# Patient Record
Sex: FEMALE | Race: WHITE | NOT HISPANIC OR LATINO
[De-identification: names, ages, dates, MRNs, and addresses within clinical notes are randomized per-mention and may not be internally consistent; named-entity substitution may affect disease eponyms.]

---

## 2017-11-09 ENCOUNTER — RECORD ABSTRACTING (OUTPATIENT)
Age: 67
End: 2017-11-09

## 2017-11-09 DIAGNOSIS — Z98.1 ARTHRODESIS STATUS: ICD-10-CM

## 2017-11-09 DIAGNOSIS — Z82.49 FAMILY HISTORY OF ISCHEMIC HEART DISEASE AND OTHER DISEASES OF THE CIRCULATORY SYSTEM: ICD-10-CM

## 2017-11-09 DIAGNOSIS — Z99.89 OBSTRUCTIVE SLEEP APNEA (ADULT) (PEDIATRIC): ICD-10-CM

## 2017-11-09 DIAGNOSIS — Q76.1 KLIPPEL-FEIL SYNDROME: ICD-10-CM

## 2017-11-09 DIAGNOSIS — K21.9 GASTRO-ESOPHAGEAL REFLUX DISEASE W/OUT ESOPHAGITIS: ICD-10-CM

## 2017-11-09 DIAGNOSIS — G47.33 OBSTRUCTIVE SLEEP APNEA (ADULT) (PEDIATRIC): ICD-10-CM

## 2017-11-09 DIAGNOSIS — E03.9 HYPOTHYROIDISM, UNSPECIFIED: ICD-10-CM

## 2017-11-09 DIAGNOSIS — Z78.9 OTHER SPECIFIED HEALTH STATUS: ICD-10-CM

## 2017-12-12 ENCOUNTER — APPOINTMENT (OUTPATIENT)
Dept: CARDIOLOGY | Facility: CLINIC | Age: 67
End: 2017-12-12
Payer: MEDICARE

## 2017-12-12 ENCOUNTER — NON-APPOINTMENT (OUTPATIENT)
Age: 67
End: 2017-12-12

## 2017-12-12 VITALS
WEIGHT: 166 LBS | DIASTOLIC BLOOD PRESSURE: 60 MMHG | HEIGHT: 64 IN | BODY MASS INDEX: 28.34 KG/M2 | SYSTOLIC BLOOD PRESSURE: 118 MMHG

## 2017-12-12 VITALS — HEART RATE: 60 BPM

## 2017-12-12 PROCEDURE — 93000 ELECTROCARDIOGRAM COMPLETE: CPT

## 2017-12-12 PROCEDURE — 99214 OFFICE O/P EST MOD 30 MIN: CPT

## 2018-11-02 ENCOUNTER — RX RENEWAL (OUTPATIENT)
Age: 68
End: 2018-11-02

## 2018-12-18 ENCOUNTER — NON-APPOINTMENT (OUTPATIENT)
Age: 68
End: 2018-12-18

## 2018-12-18 ENCOUNTER — APPOINTMENT (OUTPATIENT)
Dept: CARDIOLOGY | Facility: CLINIC | Age: 68
End: 2018-12-18
Payer: MEDICARE

## 2018-12-18 VITALS
SYSTOLIC BLOOD PRESSURE: 122 MMHG | HEART RATE: 71 BPM | DIASTOLIC BLOOD PRESSURE: 62 MMHG | WEIGHT: 170 LBS | BODY MASS INDEX: 29.18 KG/M2

## 2018-12-18 PROCEDURE — 93000 ELECTROCARDIOGRAM COMPLETE: CPT

## 2018-12-18 PROCEDURE — 99214 OFFICE O/P EST MOD 30 MIN: CPT

## 2018-12-18 NOTE — REASON FOR VISIT
[Follow-Up - Clinic] : a clinic follow-up of [Atrial Fibrillation] : atrial fibrillation [Hyperlipidemia] : hyperlipidemia [Medication Management] : Medication management

## 2018-12-18 NOTE — CARDIOLOGY SUMMARY
[___] : [unfilled] [No Ischemia] : no Ischemia [LVEF ___%] : LVEF [unfilled]% [None] : no pulmonary hypertension [Enlarged] : enlarged LA size

## 2018-12-18 NOTE — PHYSICAL EXAM
[General Appearance - Well Developed] : well developed [Normal Appearance] : normal appearance [Well Groomed] : well groomed [General Appearance - Well Nourished] : well nourished [No Deformities] : no deformities [General Appearance - In No Acute Distress] : no acute distress [Normal Conjunctiva] : the conjunctiva exhibited no abnormalities [Eyelids - No Xanthelasma] : the eyelids demonstrated no xanthelasmas [Normal Oral Mucosa] : normal oral mucosa [No Oral Pallor] : no oral pallor [No Oral Cyanosis] : no oral cyanosis [Normal Jugular Venous A Waves Present] : normal jugular venous A waves present [Normal Jugular Venous V Waves Present] : normal jugular venous V waves present [No Jugular Venous Alonzo A Waves] : no jugular venous alonzo A waves [Respiration, Rhythm And Depth] : normal respiratory rhythm and effort [Exaggerated Use Of Accessory Muscles For Inspiration] : no accessory muscle use [Auscultation Breath Sounds / Voice Sounds] : lungs were clear to auscultation bilaterally [Heart Rate And Rhythm] : heart rate and rhythm were normal [Heart Sounds] : normal S1 and S2 [Murmurs] : no murmurs present [Abdomen Soft] : soft [Abdomen Tenderness] : non-tender [Abdomen Mass (___ Cm)] : no abdominal mass palpated [Abnormal Walk] : normal gait [Gait - Sufficient For Exercise Testing] : the gait was sufficient for exercise testing [Nail Clubbing] : no clubbing of the fingernails [Cyanosis, Localized] : no localized cyanosis [Petechial Hemorrhages (___cm)] : no petechial hemorrhages [Skin Color & Pigmentation] : normal skin color and pigmentation [] : no rash [No Venous Stasis] : no venous stasis [Skin Lesions] : no skin lesions [No Skin Ulcers] : no skin ulcer [No Xanthoma] : no  xanthoma was observed [Oriented To Time, Place, And Person] : oriented to person, place, and time [Affect] : the affect was normal [Mood] : the mood was normal [No Anxiety] : not feeling anxious

## 2018-12-20 NOTE — REVIEW OF SYSTEMS
[see HPI] : see HPI [Palpitations] : palpitations [Recent Weight Gain (___ Lbs)] : recent [unfilled] ~Ulb weight gain [Negative] : Cardiovascular

## 2018-12-20 NOTE — HISTORY OF PRESENT ILLNESS
[FreeTextEntry1] : YUE GARCIA  is a 68 year old  F\par \par \par with a history of AF (see below), hypothyroidism, GERD, hyperlipidemia and cervical fusion.   \par There is possible von Willebrand disease. She tolerates the CPAP at night. \par Family history is notable for accelerated atherosclerosis.  It is notable that her family members had developed cardiovascular disease in the 60s.  Blood work did demonstrate a rise in her cholesterol.   She is taking her Lipitor therapy without any issues. No symptoms of myalgias or muscle aches. \par She has now retired. \par There are no palpitations.  There is no shortness of breath.   There has been no recurrence of atrial fibrillation. \par t baseline she is active.  She walks her dog, does yoga. \par She is compliant with her CPAP therapy.  She now takes low dose Lipitor therapy.\par There is no exertional chest pain, pressure or discomfort.   \par \par No recurrence of previously described dysrhythmia. \par No recent hospitalizations or procedures. \par No medication changes. \par \par There is no prior history of a clinical myocardial infarction, coronary revascularization. \par There is no history of symptomatic congestive heart failure rheumatic heart disease or valvular disease.\par \par Echocardiogram September 2015.  Normal left ventricular function.  Ejection fraction 60%.  Mild left atrial enlargement.  No significant valvular heart disease.  \par \par Stress echocardiogram.  September 2015.  Exercise 6-1/2 minutes.  Mild dyspnea.  Nonspecific EKG changes.  Normal augmentation of LV function and wall motion.\par \par Blood work performed.  May 2016.  HDL 67, potassium 3.9, creatinine 0.6, LDL 88, total cholesterol 179.  \par Pre-statin blood work September 2012 total cholesterol 231, triglycerides 149, LDL of 159\par \par Sleep study was performed in January 2013. There was moderate-to-severe obstructive sleep apnea causing sleep disturbed breathing, nocturnal hypoxemia, and snoring.\par \par EKG NSR\par \par

## 2018-12-20 NOTE — ASSESSMENT
[FreeTextEntry1] : Single episode of atrial fibrillation for which she underwent cardioversion and antiarrhythmic therapy.\par Afterwards fatigue and exhaustion limiting her activity and quality of life from pharmacologic therapy. \par No recurrence of atrial fibrillation off antiarrhythmic therapy. \par Discussed possibility of recurrent atrial fibrillation. If this is the case then we will need to use an alternative strategy and refer to EP. \par Reviewed her thromboembolic risk profile. CHADS2 score low (CHADSVasc only due to gender/age). \par Questionable history of von Willebrand. \par Lowered aspirin dose to 81 mg. \par Emphasized the importance of treatment of obstructive sleep apnea to reduce associated cardiovascular consequences. \par \par Hyperlipidemia, refractory to lifestyle measures. Significant family history of accelerated atherosclerosis. Previously we had discontinued statin therapy after marked weight loss, but cholesterol continues to remain high. \par Laboratory data has been reviewed with the patient.\par Continue statin therapy. \par Last lipid profile reviewed. Tolerated without adverse effect. \par Continue primary prevention of ischemic heart disease. \par Instructed to call if any side effects. \par \par Treatment of obstructive sleep apnea to prevent associated cardiovascular consequences including atrial fibrillation. \par Prior echocardiogram reviewed no associated pulmonary hyperension\par  \par Continue regular aerobic exercise program. \par Labs requested

## 2019-12-18 ENCOUNTER — RECORD ABSTRACTING (OUTPATIENT)
Age: 69
End: 2019-12-18

## 2019-12-31 ENCOUNTER — APPOINTMENT (OUTPATIENT)
Dept: CARDIOLOGY | Facility: CLINIC | Age: 69
End: 2019-12-31
Payer: MEDICARE

## 2019-12-31 ENCOUNTER — NON-APPOINTMENT (OUTPATIENT)
Age: 69
End: 2019-12-31

## 2019-12-31 VITALS
HEIGHT: 64 IN | BODY MASS INDEX: 28.34 KG/M2 | OXYGEN SATURATION: 97 % | DIASTOLIC BLOOD PRESSURE: 68 MMHG | HEART RATE: 90 BPM | SYSTOLIC BLOOD PRESSURE: 126 MMHG | WEIGHT: 166 LBS

## 2019-12-31 PROCEDURE — 99213 OFFICE O/P EST LOW 20 MIN: CPT

## 2019-12-31 PROCEDURE — 93000 ELECTROCARDIOGRAM COMPLETE: CPT

## 2019-12-31 NOTE — HISTORY OF PRESENT ILLNESS
[FreeTextEntry1] : YUE GARCIA  is a 69 year old  F\par \par \par with a history of AF (see below), hypothyroidism, TRUPTI, GERD, hyperlipidemia and cervical fusion.   \par There is possible von Willebrand disease. \par Family history is notable for accelerated atherosclerosis.  \par \par There are no palpitations.  There is no shortness of breath.   There has been no recurrence of atrial fibrillation. \par At baseline she is active.  She walks her dog, does yoga. \par She is compliant with her CPAP therapy.  \par There is no exertional chest pain, pressure or discomfort.   \par No recurrence of previously described dysrhythmia. \par No recent hospitalizations or procedures. \par No medication changes. \par \par There is no prior history of a clinical myocardial infarction, coronary revascularization. \par There is no history of symptomatic congestive heart failure rheumatic heart disease or valvular disease.\par \par Echocardiogram September 2015.  Normal left ventricular function.  Ejection fraction 60%.  Mild left atrial enlargement.  No significant valvular heart disease.  \par \par Stress echocardiogram.  September 2015.  Exercise 6-1/2 minutes.  Mild dyspnea.  Nonspecific EKG changes.  Normal augmentation of LV function and wall motion.\par \par Sleep study was performed in January 2013. There was moderate-to-severe obstructive sleep apnea causing sleep disturbed breathing, nocturnal hypoxemia, and snoring.\par \par EKG demonstrates normal sinus rhythm and nonspecific ST changes. \par December 2019, total cholesterol 151, potassium 4.1, creatinine 0.8, hemoglobin 13.3, LDL 71. \par

## 2019-12-31 NOTE — ASSESSMENT
[FreeTextEntry1] : Single episode of atrial fibrillation for which she underwent cardioversion and antiarrhythmic therapy.\par Afterwards fatigue and exhaustion limiting her activity and quality of life from pharmacologic therapy. \par No recurrence of atrial fibrillation off antiarrhythmic therapy. \par Discussed possibility of recurrent atrial fibrillation. If this is the case then we will need to use an alternative strategy and refer to EP. \par Questionable history of von Willebrand. \par Emphasized the importance of treatment of obstructive sleep apnea to reduce associated cardiovascular consequences. \par \par Hyperlipidemia, refractory to lifestyle measures. Significant family history of accelerated atherosclerosis. \par Continue statin therapy. \par Last lipid profile reviewed. Tolerated without adverse effect. \par Primary prevention of ischemic heart disease. \par \par Treatment of obstructive sleep apnea to prevent associated cardiovascular consequences including atrial fibrillation. \par Prior echocardiogram reviewed no associated pulmonary hyperension\par Continue regular aerobic exercise program. \par Statin therapy has been refilled. \par

## 2019-12-31 NOTE — REVIEW OF SYSTEMS
[see HPI] : see HPI [Negative] : Heme/Lymph [Recent Weight Gain (___ Lbs)] : no recent weight gain [Palpitations] : no palpitations

## 2019-12-31 NOTE — PHYSICAL EXAM
[General Appearance - Well Developed] : well developed [Normal Appearance] : normal appearance [Well Groomed] : well groomed [General Appearance - Well Nourished] : well nourished [No Deformities] : no deformities [General Appearance - In No Acute Distress] : no acute distress [Normal Conjunctiva] : the conjunctiva exhibited no abnormalities [Normal Oral Mucosa] : normal oral mucosa [Eyelids - No Xanthelasma] : the eyelids demonstrated no xanthelasmas [No Oral Pallor] : no oral pallor [No Oral Cyanosis] : no oral cyanosis [Normal Jugular Venous V Waves Present] : normal jugular venous V waves present [No Jugular Venous Alonzo A Waves] : no jugular venous alonzo A waves [Normal Jugular Venous A Waves Present] : normal jugular venous A waves present [Respiration, Rhythm And Depth] : normal respiratory rhythm and effort [Exaggerated Use Of Accessory Muscles For Inspiration] : no accessory muscle use [Auscultation Breath Sounds / Voice Sounds] : lungs were clear to auscultation bilaterally [Heart Sounds] : normal S1 and S2 [Heart Rate And Rhythm] : heart rate and rhythm were normal [Murmurs] : no murmurs present [Abdomen Soft] : soft [Abdomen Tenderness] : non-tender [Abnormal Walk] : normal gait [Abdomen Mass (___ Cm)] : no abdominal mass palpated [Nail Clubbing] : no clubbing of the fingernails [Gait - Sufficient For Exercise Testing] : the gait was sufficient for exercise testing [Petechial Hemorrhages (___cm)] : no petechial hemorrhages [Cyanosis, Localized] : no localized cyanosis [Skin Color & Pigmentation] : normal skin color and pigmentation [] : no rash [No Venous Stasis] : no venous stasis [Skin Lesions] : no skin lesions [No Xanthoma] : no  xanthoma was observed [No Skin Ulcers] : no skin ulcer [Oriented To Time, Place, And Person] : oriented to person, place, and time [Affect] : the affect was normal [Mood] : the mood was normal [No Anxiety] : not feeling anxious

## 2020-10-05 ENCOUNTER — APPOINTMENT (OUTPATIENT)
Dept: CARDIOLOGY | Facility: CLINIC | Age: 70
End: 2020-10-05
Payer: MEDICARE

## 2020-10-05 ENCOUNTER — NON-APPOINTMENT (OUTPATIENT)
Age: 70
End: 2020-10-05

## 2020-10-05 VITALS
HEIGHT: 64 IN | BODY MASS INDEX: 28.51 KG/M2 | OXYGEN SATURATION: 98 % | SYSTOLIC BLOOD PRESSURE: 136 MMHG | DIASTOLIC BLOOD PRESSURE: 64 MMHG | WEIGHT: 167 LBS | HEART RATE: 81 BPM | RESPIRATION RATE: 16 BRPM

## 2020-10-05 PROCEDURE — 99214 OFFICE O/P EST MOD 30 MIN: CPT

## 2020-10-05 PROCEDURE — 93000 ELECTROCARDIOGRAM COMPLETE: CPT

## 2020-10-05 RX ORDER — CALCIUM CARBONATE 300MG(750)
1000 TABLET,CHEWABLE ORAL
Refills: 0 | Status: ACTIVE | COMMUNITY

## 2020-10-06 NOTE — ASSESSMENT
[FreeTextEntry1] : Single episode of atrial fibrillation for which she underwent cardioversion and antiarrhythmic therapy.\par Afterwards fatigue and exhaustion limiting her activity and quality of life from pharmacologic therapy. \par No recurrence of atrial fibrillation off antiarrhythmic therapy. \par Discussed possibility of recurrent atrial fibrillation. If this is the case then we will need to use an alternative strategy and refer to EP. \par Questionable history of von Willebrand. \par Emphasized the importance of treatment of obstructive sleep apnea to reduce associated cardiovascular consequences. \par \par Hyperlipidemia, refractory to lifestyle measures. Significant family history of accelerated atherosclerosis. \par Continue statin therapy. \par Last lipid profile reviewed. Tolerated without adverse effect. \par Primary prevention of ischemic heart disease. \par \par Treatment of obstructive sleep apnea to prevent associated cardiovascular consequences including atrial fibrillation. \par \par Possible syncope.  Sleep apnea.  \par Echocardiogram rule out pulmonary hypertension.  \par Carotid Doppler study.  \par Screening abdominal ultrasound.  \par Exercise tolerance test.  \par Upcoming blood work requested.\par Continue regular aerobic exercise program. \par \par

## 2020-10-06 NOTE — HISTORY OF PRESENT ILLNESS
[FreeTextEntry1] : YUE GARCIA  is a 70 year old  F\par \par with a history of transient AF, hypothyroidism, TRUPTI, GERD, hyperlipidemia and cervical fusion.   \par There is possible von Willebrand disease. \par Family history is notable for accelerated atherosclerosis.  \par There is no prior history of a clinical myocardial infarction, coronary revascularization. \par There is no history of symptomatic congestive heart failure rheumatic heart disease or valvular disease.\par \par There are no palpitations.  There is no shortness of breath.   There has been no recurrence of atrial fibrillation. \par At baseline she is active.  She walks her dog, does yoga. \par She is compliant with her CPAP therapy.  \par There is no exertional chest pain, pressure or discomfort.   \par No recurrence of previously described dysrhythmia. \par No recent hospitalizations or procedures. \par No medication changes. \par \par There was an unexplained fall.  She is unclear if she lost consciousness. \par \par EKG demonstrates sinus rhythm.  \par \par Echocardiogram September 2015.  Normal left ventricular function.  Ejection fraction 60%.  Mild left atrial enlargement.  No significant valvular heart disease.  \par \par Stress echocardiogram.  September 2015.  Exercise 6-1/2 minutes.  Mild dyspnea.  Nonspecific EKG changes.  Normal augmentation of LV function and wall motion.\par \par Sleep study was performed in January 2013. There was moderate-to-severe obstructive sleep apnea causing sleep disturbed breathing, nocturnal hypoxemia, and snoring.\par \par EKG demonstrates normal sinus rhythm and nonspecific ST changes. \par December 2019, total cholesterol 151, potassium 4.1, creatinine 0.8, hemoglobin 13.3, LDL 71. \par

## 2020-10-30 ENCOUNTER — NON-APPOINTMENT (OUTPATIENT)
Age: 70
End: 2020-10-30

## 2020-11-16 ENCOUNTER — APPOINTMENT (OUTPATIENT)
Dept: CARDIOLOGY | Facility: CLINIC | Age: 70
End: 2020-11-16
Payer: MEDICARE

## 2020-11-16 PROCEDURE — 93306 TTE W/DOPPLER COMPLETE: CPT

## 2020-11-16 PROCEDURE — 93015 CV STRESS TEST SUPVJ I&R: CPT

## 2020-11-16 PROCEDURE — 93880 EXTRACRANIAL BILAT STUDY: CPT

## 2020-11-16 PROCEDURE — 93979 VASCULAR STUDY: CPT

## 2020-11-17 ENCOUNTER — APPOINTMENT (OUTPATIENT)
Dept: CARDIOLOGY | Facility: CLINIC | Age: 70
End: 2020-11-17
Payer: MEDICARE

## 2020-11-17 VITALS
BODY MASS INDEX: 29.02 KG/M2 | DIASTOLIC BLOOD PRESSURE: 60 MMHG | TEMPERATURE: 97.1 F | HEIGHT: 64 IN | HEART RATE: 87 BPM | SYSTOLIC BLOOD PRESSURE: 118 MMHG | OXYGEN SATURATION: 98 % | WEIGHT: 170 LBS

## 2020-11-17 PROCEDURE — 99214 OFFICE O/P EST MOD 30 MIN: CPT

## 2020-11-17 NOTE — ASSESSMENT
[FreeTextEntry1] : YUE GARCIA  is a 70 year F  who presents today Nov 17, 2020 with the above history and the following active issues. \par \par Single episode of atrial fibrillation for which she underwent cardioversion and antiarrhythmic therapy.\par No recurrence of atrial fibrillation off antiarrhythmic therapy. \par Discussed possibility of recurrent atrial fibrillation. If this is the case then we will need to use an alternative strategy and refer to EP. \par Questionable history of von Willebrand. \par Emphasized the importance of treatment of obstructive sleep apnea to reduce associated cardiovascular consequences. \par \par Hyperlipidemia, refractory to lifestyle measures. Significant family history of accelerated atherosclerosis. \par Continue statin therapy. \par Last lipid profile reviewed. Tolerated without adverse effect. \par Primary prevention of ischemic heart disease. \par \par Treatment of obstructive sleep apnea to prevent associated cardiovascular consequences including atrial fibrillation. \par \par Possible syncope. Echocardiogram demonstrated normal resting cardiac structure and function.   \par Carotid Doppler demonstrated normal coronary arteries with no significant stenosis. \par Screening abdominal ultrasound without evidence of AAA.\par Exercise tolerance test with EKG changes equivocal for exercise induced ischemia. Awaiting further ischemic evaluation with nuclear stress test. \par Upcoming blood work requested.\par Continue regular aerobic exercise program. \par Limitations of non-invasive testing reviewed.\par \par Red flag symptoms which would warrant sooner emergent evaluation reviewed with the patient. \par Questions and concerns were addressed and answered. \par \par Sincerely,\par \par Terra Wright PA-C\par Patients history, testing and plan reviewed with supervising MD: Dr. Kevin Cantrell

## 2020-11-17 NOTE — HISTORY OF PRESENT ILLNESS
[FreeTextEntry1] : YUE GARCIA  is a 70 year F  who presents today Nov 17, 2020 in clinical follow-up. At last office visit on October 5, 2020 echo, carotid US, abd US and ETT recommended. Testing has been performed and she presents today to review the results. She remains asymptomatic from arrhythmia and cardiovascular standpoint. \par At baseline she is active.  She walks her dog, does yoga. \par She is compliant with her CPAP therapy.  \par No recent hospitalizations or procedures. \par No medication changes. \par \par Today she denies chest pain, pressure, unusual shortness of breath, lightheadedness, dizziness, near syncope or syncope. \par \par History of transient AF, hypothyroidism, TRUPTI, GERD, hyperlipidemia and cervical fusion.   \par There is possible von Willebrand disease. \par Family history is notable for accelerated atherosclerosis.  \par There is no prior history of a clinical myocardial infarction, coronary revascularization. \par There is no history of symptomatic congestive heart failure rheumatic heart disease or valvular disease.\par \par There was an unexplained fall.  She is unclear if she lost consciousness. \par \par \par \par TESTING:\par \par Echo 11/16/2020 EF 60%, mild MR\par \par Carotid US 11/16/2020 normal carotid\par \par Abd US 11/16/2020 no AAA\par \par ETT 11/16/2020 EKG changes equivocal for exercise induced ischemia. Recommend nuclear stress test\par EKG demonstrates sinus rhythm.  \par \par Echocardiogram September 2015.  Normal left ventricular function.  Ejection fraction 60%.  Mild left atrial enlargement.  No significant valvular heart disease.  \par \par Stress echocardiogram.  September 2015.  Exercise 6-1/2 minutes.  Mild dyspnea.  Nonspecific EKG changes.  Normal augmentation of LV function and wall motion.\par \par Sleep study was performed in January 2013. There was moderate-to-severe obstructive sleep apnea causing sleep disturbed breathing, nocturnal hypoxemia, and snoring.\par \par EKG demonstrates normal sinus rhythm and nonspecific ST changes. \par December 2019, total cholesterol 151, potassium 4.1, creatinine 0.8, hemoglobin 13.3, LDL 71. \par

## 2020-11-17 NOTE — ADDENDUM
[FreeTextEntry1] : Please note the patient was seen and examined with AMBAR Wright.\par I was physically present during the service of the patient and personally examined the patient. \par I was directly involved in the management plan and recommendations of the care provided to the patient. \par I personally reviewed the history and physical examination as documented by the PA above.\par 11/17/2020\par

## 2020-11-23 ENCOUNTER — APPOINTMENT (OUTPATIENT)
Dept: CARDIOLOGY | Facility: CLINIC | Age: 70
End: 2020-11-23
Payer: MEDICARE

## 2020-11-23 PROCEDURE — A9502: CPT

## 2020-11-23 PROCEDURE — 93015 CV STRESS TEST SUPVJ I&R: CPT

## 2020-11-23 PROCEDURE — 78452 HT MUSCLE IMAGE SPECT MULT: CPT

## 2020-12-02 ENCOUNTER — APPOINTMENT (OUTPATIENT)
Dept: CARDIOLOGY | Facility: CLINIC | Age: 70
End: 2020-12-02
Payer: MEDICARE

## 2020-12-02 VITALS
BODY MASS INDEX: 28.68 KG/M2 | HEIGHT: 64 IN | DIASTOLIC BLOOD PRESSURE: 72 MMHG | WEIGHT: 168 LBS | OXYGEN SATURATION: 97 % | HEART RATE: 86 BPM | SYSTOLIC BLOOD PRESSURE: 132 MMHG

## 2020-12-02 PROCEDURE — 99214 OFFICE O/P EST MOD 30 MIN: CPT

## 2020-12-02 NOTE — HISTORY OF PRESENT ILLNESS
[FreeTextEntry1] : YUE GARCIA  is a 70 year F  who presents today to review cardiovascular testing. \par \par  She remains asymptomatic from arrhythmia and cardiovascular standpoint. \par At baseline she is active.  She walks her dog, does yoga. \par She is compliant with her CPAP therapy.  \par No recent hospitalizations or procedures. \par No medication changes. \par \par There was one isolated event of fall. Unclear if LOC. No recurrence. \par Today she denies chest pain, pressure, unusual shortness of breath, lightheadedness, dizziness, near syncope or syncope. \par \par History of transient AF, hypothyroidism, TRUPTI, GERD, hyperlipidemia and cervical fusion.   \par There is possible von Willebrand disease. \par Family history is notable for accelerated atherosclerosis.  \par There is no prior history of a clinical myocardial infarction, coronary revascularization. \par There is no history of symptomatic congestive heart failure rheumatic heart disease or valvular disease.\par \par TESTING:\par \par ETT 11/16/2020 EKG changes equivocal for exercise induced ischemia. \par Nuclear stress test reviewed today, performed 11/23/2020 shows likely normal myocardial perfusion, breast attenuation was noted. \par \par Echo 11/16/2020 EF 60%, mild MR\par \par Carotid US 11/16/2020 normal carotid\par \par Abd US 11/16/2020 no AAA\par \par Labs 10/30/2020, TSH 6.82 (following with endo),  A1c 5.6, LDL 63, trig 202, k 4, creat 0.64\par \par EKG demonstrates sinus rhythm.  \par \par \par \par Sleep study was performed in January 2013. There was moderate-to-severe obstructive sleep apnea causing sleep disturbed breathing, nocturnal hypoxemia, and snoring.\par

## 2020-12-02 NOTE — ASSESSMENT
[FreeTextEntry1] : YUE GARCIA is a 70 year old F who presents today Dec 02, 2020 to review nuclear stress testing, with the above history and the following active issues:  \par \par Abl EKG response on ETT (equivocal).\par There is history of subclinical atherosclerosis. \par There is no exertional symptoms with good functional status. \par Nuclear stress test shows overall normal perfusion, no high risk features. \par Reviewed limitations of noninvasive testing and if any new or worsening symptoms should occur advised him to notify our office immediately for further evaluation. \par Otherwise continue aggressive primary prevention - ASA, statin, BP control, and lifestyle modification measures. \par \par Single episode of atrial fibrillation for which she underwent cardioversion and antiarrhythmic therapy.\par No recurrence of atrial fibrillation off antiarrhythmic therapy. \par Discussed possibility of recurrent atrial fibrillation. If this is the case then we will need to use an alternative strategy and refer to EP. \par Questionable history of von Willebrand. \par Emphasized the importance of treatment of obstructive sleep apnea to reduce associated cardiovascular consequences. \par Thyroid supp with endo. \par \par Hyperlipidemia, refractory to lifestyle measures. Significant family history of accelerated atherosclerosis. \par Continue statin therapy. \par Last lipid profile reviewed, LDL at goal <70. Tolerated without adverse effect. \par \par Possible syncope. Echocardiogram demonstrated normal resting cardiac structure and function.   \par Carotid Doppler demonstrated normal coronary arteries with no significant stenosis. \par Screening abdominal ultrasound without evidence of AAA.\par Normal CV response on ETT and no sig evidence of ischemia on nuclear testing. \par If recurrence advised to call for further eval. \par Continue regular aerobic exercise program. \par Limitations of non-invasive testing reviewed.\par \par Annual CV followup advised. \par Any questions and concerns were addressed and resolved. \par \par Sincerely,\par \par JANET Mccabe\par Patients history, testing, and plan reviewed with supervising MD: Dr. Anna Carrera

## 2020-12-02 NOTE — PHYSICAL EXAM
[General Appearance - Well Developed] : well developed [Normal Appearance] : normal appearance [Well Groomed] : well groomed [General Appearance - Well Nourished] : well nourished [No Deformities] : no deformities [General Appearance - In No Acute Distress] : no acute distress [Normal Conjunctiva] : the conjunctiva exhibited no abnormalities [Eyelids - No Xanthelasma] : the eyelids demonstrated no xanthelasmas [No Oral Pallor] : no oral pallor [No Oral Cyanosis] : no oral cyanosis [Normal Jugular Venous A Waves Present] : normal jugular venous A waves present [Normal Jugular Venous V Waves Present] : normal jugular venous V waves present [No Jugular Venous Alonzo A Waves] : no jugular venous alonzo A waves [Respiration, Rhythm And Depth] : normal respiratory rhythm and effort [Exaggerated Use Of Accessory Muscles For Inspiration] : no accessory muscle use [Auscultation Breath Sounds / Voice Sounds] : lungs were clear to auscultation bilaterally [Heart Rate And Rhythm] : heart rate and rhythm were normal [Heart Sounds] : normal S1 and S2 [Murmurs] : no murmurs present [Abdomen Soft] : soft [Abdomen Tenderness] : non-tender [Abdomen Mass (___ Cm)] : no abdominal mass palpated [Abnormal Walk] : normal gait [Gait - Sufficient For Exercise Testing] : the gait was sufficient for exercise testing [Nail Clubbing] : no clubbing of the fingernails [Cyanosis, Localized] : no localized cyanosis [Petechial Hemorrhages (___cm)] : no petechial hemorrhages [Skin Color & Pigmentation] : normal skin color and pigmentation [] : no rash [No Venous Stasis] : no venous stasis [Skin Lesions] : no skin lesions [No Skin Ulcers] : no skin ulcer [No Xanthoma] : no  xanthoma was observed [Oriented To Time, Place, And Person] : oriented to person, place, and time [Affect] : the affect was normal [Mood] : the mood was normal [No Anxiety] : not feeling anxious

## 2021-12-06 ENCOUNTER — NON-APPOINTMENT (OUTPATIENT)
Age: 71
End: 2021-12-06

## 2021-12-06 ENCOUNTER — APPOINTMENT (OUTPATIENT)
Dept: CARDIOLOGY | Facility: CLINIC | Age: 71
End: 2021-12-06
Payer: MEDICARE

## 2021-12-06 VITALS
HEIGHT: 64 IN | BODY MASS INDEX: 27.83 KG/M2 | TEMPERATURE: 97.1 F | SYSTOLIC BLOOD PRESSURE: 106 MMHG | OXYGEN SATURATION: 98 % | DIASTOLIC BLOOD PRESSURE: 62 MMHG | WEIGHT: 163 LBS | HEART RATE: 64 BPM

## 2021-12-06 DIAGNOSIS — Z82.49 FAMILY HISTORY OF ISCHEMIC HEART DISEASE AND OTHER DISEASES OF THE CIRCULATORY SYSTEM: ICD-10-CM

## 2021-12-06 PROCEDURE — 99214 OFFICE O/P EST MOD 30 MIN: CPT

## 2021-12-06 PROCEDURE — 93000 ELECTROCARDIOGRAM COMPLETE: CPT

## 2021-12-06 RX ORDER — ASPIRIN ENTERIC COATED TABLETS 81 MG 81 MG/1
81 TABLET, DELAYED RELEASE ORAL DAILY
Qty: 30 | Refills: 3 | Status: DISCONTINUED | COMMUNITY
End: 2021-12-06

## 2021-12-10 NOTE — HISTORY OF PRESENT ILLNESS
[FreeTextEntry1] : YUE GARCIA  is a 71 year old  F\par \par History of transient AF, hypothyroidism, TRUPTI, GERD, hyperlipidemia and cervical fusion.   \par There is possible von Willebrand disease. \par Family history is notable for accelerated atherosclerosis.  \par \par There is no prior history of a clinical myocardial infarction, coronary revascularization. \par There is no history of symptomatic congestive heart failure rheumatic heart disease \par \par She remains asymptomatic from arrhythmia and cardiovascular standpoint. \par She walks her dog she has been less active with usual exercise\par She is compliant with her CPAP therapy.  \par No recent hospitalizations or procedures. \par No medication changes. \par \par Today she denies chest pain, pressure, unusual shortness of breath, lightheadedness, dizziness, near syncope or syncope. \par \par Nuclear stress test reviewed today, performed 11/23/2020 shows likely normal myocardial perfusion, breast attenuation was noted. \par Echo 11/16/2020 EF 60%, mild MR\par Carotid US 11/16/2020 normal carotid\par Abd US 11/16/2020 no AAA\par Labs 10/30/2020, TSH 6.82 (following with endo),  A1c 5.6, LDL 63, trig 202, k 4, creat 0.64\par EKG demonstrates sinus rhythm.  \par Sleep study was performed in January 2013. There was moderate-to-severe obstructive sleep apnea causing sleep disturbed breathing, nocturnal hypoxemia, and snoring.\par \par She walks her dog she has been less active with usual exercise \par in light of the recent data I have stopped her aspirin there is a history of von Willebrand's disease \par continue statin therapy \par follow-up blood work has been requested \par increase aerobic exercise \par further cardiovascular testing as guided by symptoms c\par

## 2021-12-10 NOTE — ASSESSMENT
[FreeTextEntry1] : Single episode of atrial fibrillation underwent cardioversion then antiarrhythmic therapy.\par No recurrence of atrial fibrillation off antiarrhythmic therapy. \par Discussed possibility of recurrent atrial fibrillation.\par history of von Willebrand. \par Emphasized the importance of treatment of obstructive sleep apnea to reduce associated cardiovascular consequences. \par Thyroid supp with endo. \par \par Subclinical atherosclerosis. \par No exertional symptoms with good functional status. \par Nuclear stress test normal perfusion\par Primary prevention - statin, BP control, and lifestyle measures. \par Hyperlipidemia, refractory to lifestyle measures. \par Significant family history of accelerated atherosclerosis. \par Continue statin therapy. \par Tolerated without adverse effect. \par \par \par Limitations of non-invasive testing reviewed.\par \par Annual CV followup advised. \par Any questions and concerns were addressed and resolved.

## 2021-12-15 ENCOUNTER — NON-APPOINTMENT (OUTPATIENT)
Age: 71
End: 2021-12-15

## 2022-05-16 ENCOUNTER — RESULT CHARGE (OUTPATIENT)
Age: 72
End: 2022-05-16

## 2022-05-17 ENCOUNTER — APPOINTMENT (OUTPATIENT)
Dept: CARDIOLOGY | Facility: CLINIC | Age: 72
End: 2022-05-17
Payer: MEDICARE

## 2022-05-17 ENCOUNTER — NON-APPOINTMENT (OUTPATIENT)
Age: 72
End: 2022-05-17

## 2022-05-17 VITALS
BODY MASS INDEX: 28 KG/M2 | DIASTOLIC BLOOD PRESSURE: 60 MMHG | HEIGHT: 64 IN | HEART RATE: 63 BPM | TEMPERATURE: 98 F | WEIGHT: 164 LBS | SYSTOLIC BLOOD PRESSURE: 104 MMHG | OXYGEN SATURATION: 97 %

## 2022-05-17 PROCEDURE — 93246 EXT ECG>7D<15D RECORDING: CPT | Mod: 59

## 2022-05-17 PROCEDURE — 93000 ELECTROCARDIOGRAM COMPLETE: CPT

## 2022-05-17 PROCEDURE — 99214 OFFICE O/P EST MOD 30 MIN: CPT

## 2022-05-17 RX ORDER — METOPROLOL TARTRATE 25 MG/1
25 TABLET, FILM COATED ORAL TWICE DAILY
Refills: 0 | Status: DISCONTINUED | COMMUNITY
End: 2022-05-17

## 2022-05-18 NOTE — REVIEW OF SYSTEMS
[Negative] : Heme/Lymph [Feeling Fatigued] : feeling fatigued [Chest Discomfort] : chest discomfort [FreeTextEntry2] : see HPI

## 2022-05-18 NOTE — HISTORY OF PRESENT ILLNESS
[FreeTextEntry1] : YUE GARCIA  is a 71 year old  F\par \par History of transient AF, hypothyroidism, TRUPTI, GERD, hyperlipidemia and cervical fusion.   \par There is possible von Willebrand disease. \par Family history is notable for accelerated atherosclerosis.  \par \par There is no prior history of a clinical myocardial infarction, coronary revascularization. \par There is no history of symptomatic congestive heart failure rheumatic heart disease \par \par She remains asymptomatic from arrhythmia and cardiovascular standpoint. \par She walks her dog she has been less active with usual exercise\par She is compliant with her CPAP therapy.  \par No recent hospitalizations or procedures. \par \par She was [facility] over the weekend 5/14/22. Staying at [facility] had 1-2 drinks out of the norm for her - rum & coke. During the night felt the need to take her CPAP off, felt an "ache" in her heart like she when you are very depressed. She walked up and down the stairs and felt unusually fatigued w/ exertion. She had a friend take her to local ER where she had AF with RVR 130s. She was given IVF and IV cardizem then converted back to NSR. Troponin was negative. She was d/c on metop tart 25 bid and eliquis 5 bid. \par Today she denies chest pain, pressure, unusual shortness of breath, lightheadedness, dizziness, near syncope or syncope. \par EKG 5/17/22 today back in NSR. There is a new TWI in V2. \par \par Nuclear stress test performed 11/23/2020 shows likely normal myocardial perfusion, breast attenuation was noted. \par Echo 11/16/2020 EF 60%, mild MR\par Carotid US 11/16/2020 normal carotid\par Abd US 11/16/2020 no AAA\par Labs 10/30/2020, TSH 6.82 (following with endo),  A1c 5.6, LDL 63, trig 202, k 4, creat 0.64\par EKG demonstrates sinus rhythm.  \par Sleep study was performed in January 2013. There was moderate-to-severe obstructive sleep apnea causing sleep disturbed breathing, nocturnal hypoxemia, and snoring.\par \par She walks her dog she has been less active with usual exercise \par in light of the recent data I have stopped her aspirin there is a history of von Willebrand's disease \par continue statin therapy \par follow-up blood work has been requested \par increase aerobic exercise \par further cardiovascular testing as guided by symptoms \par

## 2022-05-18 NOTE — ASSESSMENT
[FreeTextEntry1] : YUE GARCIA is a 71 year old F who presents today May 17, 2022 with the above history and the following active issues: \par \par Single episode of atrial fibrillation underwent cardioversion then antiarrhythmic therapy (propafenone) 2012.\par Recurrent atrial fibrillation this past weekend 5/14/22. Reviewed ER documentation [facility]. \par Converted w/ IV cardizem. D/C on metop and eliquis. \par Educated patient on pathophysiology of atrial fibrillation and natural progression as well as associated risk of cardioembolic event. Informed her/him on indications for long term anticoagulation. Currently there is no unusual bleeding on NOAC, continue current dose. Reviewed bleeding precautions. \par *note possible history of von Willebrand. May reconsider long term OAC after CV testing. Will check CBC. \par Placed event monitor. Obtain 2d echocardiogram to evaluate resting heart structure, valvular function, and LVEF. \par Discussed possibility of ILR for long term monitoring. \par Change metop to 25mg long acting daily. Borderline BP. \par Cont CPAP for TRUPTI.\par Thyroid supp with endo. \par \par Subclinical atherosclerosis. \par No exertional symptoms with good functional status. \par Nuclear stress test normal perfusion 2020.\par Recent chest discomfort a/w recurrent AF w RVR. Note new TWI V2 on EKG. \par Discuss ischemic eval following echo and monitor above. \par \par Primary prevention - statin, BP control, and lifestyle measures. \par Hyperlipidemia, refractory to lifestyle measures. \par Significant family history of accelerated atherosclerosis. \par Continue statin therapy. \par Tolerated without adverse effect. \par \par F/U after testing. \par Any questions and concerns were addressed and resolved. \par \par Sincerely,\par \par JANET Mccaeb\par Patients history, testing, and plan reviewed with supervising MD: Dr. Kevin Cantrell

## 2022-05-18 NOTE — ADDENDUM
[FreeTextEntry1] : Please note the patient was seen and examined with NP Eleonora Vigil\par I was physically present during the service of the patient and personally examined the patient. \par I was directly involved in the management plan and recommendations of the care provided to the patient. \par I personally reviewed the history and physical examination as documented by the NP above.\par 05/17/2022\par \par favor ILR to determine need for a/c d/t h/ow VWD

## 2022-06-02 ENCOUNTER — APPOINTMENT (OUTPATIENT)
Dept: CARDIOLOGY | Facility: CLINIC | Age: 72
End: 2022-06-02
Payer: MEDICARE

## 2022-06-02 PROCEDURE — 93306 TTE W/DOPPLER COMPLETE: CPT

## 2022-06-07 ENCOUNTER — APPOINTMENT (OUTPATIENT)
Dept: CARDIOLOGY | Facility: CLINIC | Age: 72
End: 2022-06-07

## 2022-06-07 PROCEDURE — 93248 EXT ECG>7D<15D REV&INTERPJ: CPT

## 2022-06-21 ENCOUNTER — APPOINTMENT (OUTPATIENT)
Dept: CARDIOLOGY | Facility: CLINIC | Age: 72
End: 2022-06-21
Payer: MEDICARE

## 2022-06-21 VITALS
DIASTOLIC BLOOD PRESSURE: 60 MMHG | HEART RATE: 64 BPM | WEIGHT: 160 LBS | BODY MASS INDEX: 27.31 KG/M2 | OXYGEN SATURATION: 98 % | TEMPERATURE: 97.5 F | HEIGHT: 64 IN | SYSTOLIC BLOOD PRESSURE: 104 MMHG

## 2022-06-21 DIAGNOSIS — R94.39 ABNORMAL RESULT OF OTHER CARDIOVASCULAR FUNCTION STUDY: ICD-10-CM

## 2022-06-21 DIAGNOSIS — R94.31 ABNORMAL ELECTROCARDIOGRAM [ECG] [EKG]: ICD-10-CM

## 2022-06-21 PROCEDURE — 99214 OFFICE O/P EST MOD 30 MIN: CPT

## 2022-06-21 RX ORDER — TOCOPHERSOLAN (VITAMIN E TPGS) 400/15ML
LIQUID (ML) ORAL DAILY
Refills: 0 | Status: DISCONTINUED | COMMUNITY
End: 2022-06-21

## 2022-06-21 NOTE — ASSESSMENT
[FreeTextEntry1] : YUE GARCIA is a 71 year old F who presents today Jun 21, 2022 with the above history and the following active issues: \par \par Single episode of atrial fibrillation underwent cardioversion then antiarrhythmic therapy (propafenone) 2012.\par Recurrent atrial fibrillation 5/14/22. Reviewed ER documentation [facility]. \par Converted w/ IV cardizem. D/C on metop and eliquis. \par Educated patient on pathophysiology of atrial fibrillation and natural progression as well as associated risk of cardioembolic event. Informed her/him on indications for long term anticoagulation. Currently there is no unusual bleeding on NOAC, continue current dose. Reviewed bleeding precautions. \par *note possible history of von Willebrand. CBC stable at present. May reconsider long term OAC use. \par Discussed possibility of ILR for long term monitoring vs external monitoring such as apple watch and [employer] 360 eddie. \par She wishes to further discuss with  and will arrange EP consult to ultimately decide. \par Cont CPAP for TRUPTI. Cont toprol 25mg daily. Cont eliquis 5mg BID interim. \par Thyroid supp with endo. TSH was wnl. \par \par Subclinical atherosclerosis. \par No exertional symptoms with good functional status. \par Nuclear stress test normal perfusion 2020.\par Recent chest discomfort a/w recurrent AF w RVR. Note new TWI V2 on EKG. \par Stress echo will be obtained r/o ischemia. \par \par Primary prevention - statin, BP control, and lifestyle measures. \par Hyperlipidemia, refractory to lifestyle measures. \par Significant family history of accelerated atherosclerosis. \par Continue statin therapy. \par Tolerated without adverse effect. \par \par Any questions and concerns were addressed and resolved. \par \par Sincerely,\par \par Eleonora Vigil, JANET\par Patients history, testing, and plan reviewed with supervising MD: Dr. Kevin Cantrell

## 2022-06-21 NOTE — REVIEW OF SYSTEMS
[Feeling Fatigued] : feeling fatigued [Chest Discomfort] : chest discomfort [Negative] : Heme/Lymph [FreeTextEntry2] : see HPI

## 2022-06-21 NOTE — ADDENDUM
[FreeTextEntry1] : Please note the patient was reviewed with NP Eleonora Vigil.\par I was physically present during the service of the patient.\par I was directly involved in the management plan and recommendations of the care provided to the patient. \par I personally reviewed the history and physical examination as documented by the NP above.\par \par

## 2022-06-21 NOTE — HISTORY OF PRESENT ILLNESS
[FreeTextEntry1] : YUE GARCIA  is a 71 year old  F\par \par History of transient AF, hypothyroidism, TRUPTI, GERD, hyperlipidemia and cervical fusion.   \par There is possible von Willebrand disease. \par Family history is notable for accelerated atherosclerosis.  \par \par There is no prior history of a clinical myocardial infarction, coronary revascularization. \par There is no history of symptomatic congestive heart failure rheumatic heart disease \par \par She remains asymptomatic from arrhythmia and cardiovascular standpoint. \par She walks her dog she has been less active with usual exercise and yoga\par She is compliant with her CPAP therapy.  \par \par She was [facility] over the weekend 5/14/22. Staying at [facility] had 1-2 drinks out of the norm for her - rum & coke. During the night felt the need to take her CPAP off, felt an "ache" in her heart like she when you are very depressed. She walked up and down the stairs and felt unusually fatigued w/ exertion. She had a friend take her to local ER where she had AF with RVR 130s. She was given IVF and IV cardizem then converted back to NSR. Troponin was negative. She was d/c on metop tart 25 bid and eliquis 5 bid. \par Today she denies chest pain, pressure, unusual shortness of breath, lightheadedness, dizziness, near syncope or syncope. \par EKG 5/17/22 back in NSR. There is a new TWI in V2. \par And clinical follow-up metoprolol was changed to long-acting version 25mg once a day. \par BP/HR today 6/21/22 104-60 - 64\par \par Labs 5/24/2022 hemoglobin 13.7, K4.6, creatinine 1.6, TSH 0.9\par Echo 6/2/22 LVEF 55 to 60%, mild MR normal left atrial size normal LV systolic function, normal PASP, mild TR.  No significant change from prior study noted.\par Event monitor May 2022 worn 14 days showed overall normal sinus rhythm with average rate 65 bpm.  Rare ectopy was noted, sequential APCs up to 6 beats.  No recurrent AF was noted.\par ETT showed abnl EKG response then Nuclear stress test performed 11/23/2020 shows likely normal myocardial perfusion, breast attenuation was noted. \par Echo 11/16/2020 EF 60%, mild MR\par Carotid US 11/16/2020 normal carotid\par Abd US 11/16/2020 no AAA\par Labs 10/30/2020, TSH 6.82 (following with endo),  A1c 5.6, LDL 63, trig 202, k 4, creat 0.64\par EKG demonstrates sinus rhythm.  \par Sleep study was performed in January 2013. There was moderate-to-severe obstructive sleep apnea causing sleep disturbed breathing, nocturnal hypoxemia, and snoring.\par \par She walks her dog she has been less active with usual exercise \par in light of the recent data I have stopped her aspirin there is a history of von Willebrand's disease \par continue statin therapy \par follow-up blood work has been requested \par increase aerobic exercise \par further cardiovascular testing as guided by symptoms \par

## 2022-06-24 ENCOUNTER — RX RENEWAL (OUTPATIENT)
Age: 72
End: 2022-06-24

## 2022-06-30 ENCOUNTER — APPOINTMENT (OUTPATIENT)
Dept: CARDIOLOGY | Facility: CLINIC | Age: 72
End: 2022-06-30

## 2022-06-30 PROCEDURE — 93351 STRESS TTE COMPLETE: CPT

## 2022-06-30 PROCEDURE — 93320 DOPPLER ECHO COMPLETE: CPT

## 2022-08-01 ENCOUNTER — RX RENEWAL (OUTPATIENT)
Age: 72
End: 2022-08-01

## 2022-08-01 ENCOUNTER — APPOINTMENT (OUTPATIENT)
Dept: ELECTROPHYSIOLOGY | Facility: CLINIC | Age: 72
End: 2022-08-01

## 2022-08-01 VITALS
BODY MASS INDEX: 26.46 KG/M2 | WEIGHT: 155 LBS | DIASTOLIC BLOOD PRESSURE: 64 MMHG | OXYGEN SATURATION: 99 % | HEIGHT: 64 IN | SYSTOLIC BLOOD PRESSURE: 138 MMHG | HEART RATE: 52 BPM | TEMPERATURE: 97.3 F

## 2022-08-01 PROCEDURE — 99204 OFFICE O/P NEW MOD 45 MIN: CPT

## 2022-08-01 PROCEDURE — 93000 ELECTROCARDIOGRAM COMPLETE: CPT

## 2022-08-01 NOTE — DISCUSSION/SUMMARY
[FreeTextEntry1] : The patient had an episode of A. fib 5/4/2022 following thereafter a few alcoholic beverages more than she normally would have.  She does have prior history of sleep apnea but and uses her CPAP currently.  She had similar episode more than 10 years ago.\par \par Her risk factors for development of atrial fibrillation includes age.  She has no hypertension or diabetes.  She does have a prior history of thyroid disorder but is on replacement therapy with Synthroid.  Sleep apnea could also be potential source of her A. fib and she is using the current mask but its effectiveness may have been overcome by the alcohol intake as well.\par \par She is currently anticoagulated with Eliquis 5 mg twice daily.  Patient is also on rate control medication metoprolol succinate ER 25 mg/day.\par \par Patient was sent for consideration for an implantable loop monitor.  She is currently on anticoagulation with Eliquis.  Patient is not keen on getting the monitor.  I have explained the risk and benefits the patient including monitoring for asymptomatic atrial fibrillation.  She would prefer not to have now because she is concerned about interactions with Bluetooth and her CPAP machine and implantable loop monitor.  I did explain to her the technology including using a link 11 device.  I explained to her the pros and cons of monitoring.  She seemed to understand but would like to defer implantation of implantable loop monitor currently\par \par Patient feels that her A. fib was triggered by the situation she was in I away from home and had  more than usual to drink.

## 2022-08-01 NOTE — HISTORY OF PRESENT ILLNESS
[FreeTextEntry1] : Patient is a 71-year-old woman who is referred for evaluation for possible implantation of an implantable loop monitor.\par \par She has a prior history of possible von Willebrand's disease.  Patient also has a history of hypothyroidism, obstructive sleep apnea previously using CPAP ,  GERD, previous cervical fusion.\par \par Atrial fibrillation history: May 2022 the patient was [facility] when she fell an unusual sensation in her chest.  She went to the local emergency room and atrial fibrillation was noted with rapid ventricular response.  She was treated with IV medications including Cardizem and converted back to normal.  The patient was started on Eliquis as well as metoprolol 25 mg twice daily.  This situation has not reoccurred.  Prior to that her previous episode was approximately 10 years ago.  Of note this last episode of A. fib was preceded by a few more alcohol beverage than she normally would have.\par \par A. fib risk factors: History of sleep apnea.  She has no prior history of hypertension or diabetes.  She is on Synthroid for hypothyroidism.  She has no GI history.\par \par She had an echocardiogram performed 6/2/2022 that showed EF 55 to 60%, left atrial diameter 4.3 cm, mild mitral regurgitation, left atrial volume index 27 cc per metered squared, normal diastolic function, no pericardial effusion, normal pulmonary pressure.\par \par She had a treadmill stress echo performed 6/30/2022 that showed normal electrocardiographic response and normal stress echo with no inducible ischemia.\par \par

## 2022-08-01 NOTE — PHYSICAL EXAM
[Well Developed] : well developed [No Acute Distress] : no acute distress [Normal Conjunctiva] : normal conjunctiva [Normal Venous Pressure] : normal venous pressure [Normal S1, S2] : normal S1, S2 [No Murmur] : no murmur [No Rub] : no rub [Clear Lung Fields] : clear lung fields [Soft] : abdomen soft [Non Tender] : non-tender [No Edema] : no edema [No Cyanosis] : no cyanosis [No Rash] : no rash [No Focal Deficits] : no focal deficits [Alert and Oriented] : alert and oriented

## 2022-08-01 NOTE — REVIEW OF SYSTEMS
[Fever] : no fever [Weight Gain (___ Lbs)] : no recent weight gain [Blurry Vision] : no blurred vision [Sore Throat] : no sore throat [SOB] : no shortness of breath [Dyspnea on exertion] : not dyspnea during exertion [Palpitations] : no palpitations [Cough] : no cough [Abdominal Pain] : no abdominal pain [Confusion] : no confusion was observed [Easy Bleeding] : no tendency for easy bleeding

## 2022-09-03 ENCOUNTER — RX RENEWAL (OUTPATIENT)
Age: 72
End: 2022-09-03

## 2022-10-11 ENCOUNTER — APPOINTMENT (OUTPATIENT)
Dept: CARDIOLOGY | Facility: CLINIC | Age: 72
End: 2022-10-11

## 2022-10-11 VITALS
WEIGHT: 152 LBS | DIASTOLIC BLOOD PRESSURE: 62 MMHG | BODY MASS INDEX: 25.95 KG/M2 | OXYGEN SATURATION: 98 % | TEMPERATURE: 97.5 F | HEIGHT: 64 IN | SYSTOLIC BLOOD PRESSURE: 110 MMHG | HEART RATE: 56 BPM

## 2022-10-11 PROCEDURE — 99214 OFFICE O/P EST MOD 30 MIN: CPT

## 2022-10-19 ENCOUNTER — OUTPATIENT (OUTPATIENT)
Dept: OUTPATIENT SERVICES | Facility: HOSPITAL | Age: 72
LOS: 1 days | End: 2022-10-19
Payer: MEDICARE

## 2022-10-19 DIAGNOSIS — D64.9 ANEMIA, UNSPECIFIED: ICD-10-CM

## 2022-10-20 DIAGNOSIS — D68.1 HEREDITARY FACTOR XI DEFICIENCY: ICD-10-CM

## 2022-10-24 ENCOUNTER — APPOINTMENT (OUTPATIENT)
Dept: HEMATOLOGY ONCOLOGY | Facility: CLINIC | Age: 72
End: 2022-10-24

## 2022-10-26 ENCOUNTER — LABORATORY RESULT (OUTPATIENT)
Age: 72
End: 2022-10-26

## 2022-10-26 ENCOUNTER — RESULT REVIEW (OUTPATIENT)
Age: 72
End: 2022-10-26

## 2022-10-26 ENCOUNTER — APPOINTMENT (OUTPATIENT)
Dept: HEMATOLOGY ONCOLOGY | Facility: CLINIC | Age: 72
End: 2022-10-26

## 2022-10-26 VITALS
TEMPERATURE: 98.1 F | HEART RATE: 66 BPM | WEIGHT: 158 LBS | DIASTOLIC BLOOD PRESSURE: 79 MMHG | SYSTOLIC BLOOD PRESSURE: 132 MMHG | HEIGHT: 64 IN | BODY MASS INDEX: 26.98 KG/M2 | OXYGEN SATURATION: 98 %

## 2022-10-26 DIAGNOSIS — Z86.2 PERSONAL HISTORY OF DISEASES OF THE BLOOD AND BLOOD-FORMING ORGANS AND CERTAIN DISORDERS INVOLVING THE IMMUNE MECHANISM: ICD-10-CM

## 2022-10-26 LAB
BASOPHILS # BLD AUTO: 0.05 K/UL — SIGNIFICANT CHANGE UP (ref 0–0.2)
BASOPHILS NFR BLD AUTO: 0.6 % — SIGNIFICANT CHANGE UP (ref 0–2)
EOSINOPHIL # BLD AUTO: 0.17 K/UL — SIGNIFICANT CHANGE UP (ref 0–0.5)
EOSINOPHIL NFR BLD AUTO: 2.1 % — SIGNIFICANT CHANGE UP (ref 0–6)
HCT VFR BLD CALC: 39 % — SIGNIFICANT CHANGE UP (ref 34.5–45)
HGB BLD-MCNC: 13.1 G/DL — SIGNIFICANT CHANGE UP (ref 11.5–15.5)
IMM GRANULOCYTES NFR BLD AUTO: 0.3 % — SIGNIFICANT CHANGE UP (ref 0–0.9)
LYMPHOCYTES # BLD AUTO: 2.2 K/UL — SIGNIFICANT CHANGE UP (ref 1–3.3)
LYMPHOCYTES # BLD AUTO: 27.6 % — SIGNIFICANT CHANGE UP (ref 13–44)
MCHC RBC-ENTMCNC: 29.4 PG — SIGNIFICANT CHANGE UP (ref 27–34)
MCHC RBC-ENTMCNC: 33.6 GM/DL — SIGNIFICANT CHANGE UP (ref 32–36)
MCV RBC AUTO: 87.4 FL — SIGNIFICANT CHANGE UP (ref 80–100)
MONOCYTES # BLD AUTO: 0.7 K/UL — SIGNIFICANT CHANGE UP (ref 0–0.9)
MONOCYTES NFR BLD AUTO: 8.8 % — SIGNIFICANT CHANGE UP (ref 2–14)
NEUTROPHILS # BLD AUTO: 4.82 K/UL — SIGNIFICANT CHANGE UP (ref 1.8–7.4)
NEUTROPHILS NFR BLD AUTO: 60.6 % — SIGNIFICANT CHANGE UP (ref 43–77)
NRBC # BLD: 0 /100 WBCS — SIGNIFICANT CHANGE UP (ref 0–0)
PLATELET # BLD AUTO: 213 K/UL — SIGNIFICANT CHANGE UP (ref 150–400)
RBC # BLD: 4.46 M/UL — SIGNIFICANT CHANGE UP (ref 3.8–5.2)
RBC # FLD: 12.7 % — SIGNIFICANT CHANGE UP (ref 10.3–14.5)
WBC # BLD: 7.96 K/UL — SIGNIFICANT CHANGE UP (ref 3.8–10.5)
WBC # FLD AUTO: 7.96 K/UL — SIGNIFICANT CHANGE UP (ref 3.8–10.5)

## 2022-10-26 PROCEDURE — 99204 OFFICE O/P NEW MOD 45 MIN: CPT

## 2022-10-26 PROCEDURE — 85027 COMPLETE CBC AUTOMATED: CPT

## 2022-10-26 RX ORDER — IBANDRONATE SODIUM 150 MG/1
150 TABLET ORAL
Refills: 0 | Status: DISCONTINUED | COMMUNITY
End: 2022-10-26

## 2022-10-26 NOTE — RESULTS/DATA
[FreeTextEntry1] : Ms. Briceno presented at age 72 in October 2022 for evaluation of possible von willebrands disease. \par The patient has a medical history of transient AF, hypothyroidism, TRUPTI, GERD, hyperlipidemia and cervical fusion. \par \par Workup for possible von willebrands disease as below. \par

## 2022-10-26 NOTE — PHYSICAL EXAM
[Fully active, able to carry on all pre-disease performance without restriction] : Status 0 - Fully active, able to carry on all pre-disease performance without restriction [Normal] : affect appropriate [de-identified] : well appearing female, NAD, pleasant  [de-identified] : scattered moles, especially under inframammary fold

## 2022-10-26 NOTE — CONSULT LETTER
[Dear  ___] : Dear  [unfilled], [Consult Letter:] : I had the pleasure of evaluating your patient, [unfilled]. [Please see my note below.] : Please see my note below. [Consult Closing:] : Thank you very much for allowing me to participate in the care of this patient.  If you have any questions, please do not hesitate to contact me. [Sincerely,] : Sincerely, [FreeTextEntry2] : Dr. Kevin aCntrell  [FreeTextEntry3] : Dr. Chanel Reyes\par

## 2022-10-26 NOTE — HISTORY OF PRESENT ILLNESS
[de-identified] : Referred by: Dr. Kevin Cantrell\par \par Ms. Briceno presented at age 72 in October 2022 for evaluation of possible von willebrands disease. \par The patient has a medical history of transient AF, hypothyroidism, TRUPTI, GERD, hyperlipidemia and cervical fusion. \par \par Jackelyn was recently seen by Dr. Cantrell and initiated on eliquis 5mg BID for Afib with RVR. She reports a history of possible von willebrands disease so she was requested to see hematology given concerns for anticoagulation and bleeding risk. She was told many years ago that she may have von willebrand disease because she had easy bruising and occasionally heavy menses. She also suffered from infertility, she was unable to have any biological children and was told she had endometriosis. She underwent a neck fusion in 2010- had some sort of treatment for von willebrand during that surgery, and did not experience any bleeding. She denies any blood in her urine/stools while on the eliquis. She is pending possible placement of a loop recorder. She otherwise feels well, denies fevers, chills, CP, SOB, n/v/d, unintentional weight loss. She has stopped eating caffeine because she thinks this triggers her episodes of afib. \par \par Laboratory studies reviewed at today's visit and notable for: WBC 7.96, Hb 13.1, Plt 213 \par \par HCM: \par - COVID vaccination: s/p 5 doses \par - Colonoscopy: last done 20 years ago\par - Gyn: still screening \par - Mammo: due in April for MMG \par - Lung cancer screen: does not qualify \par - DEXA: done 2-3 years ago, osteoporosis- on bisphosphonate, repeat test was normal, now off of the medication \par \par SH: \par - Occupation: retired, previously worked as a \par - Living situation: lives in [place], with her , has 2 adopted children \par - Smoking/etoh/illicits: never smoker, rare etoh, denies illicits \par - Exercise: typically physically active, does yoga \par \par FH: \par - No family history of cancer

## 2022-10-29 ENCOUNTER — NON-APPOINTMENT (OUTPATIENT)
Age: 72
End: 2022-10-29

## 2022-10-29 LAB
ALBUMIN SERPL ELPH-MCNC: 4.7 G/DL
ALP BLD-CCNC: 93 U/L
ALT SERPL-CCNC: 14 U/L
ANION GAP SERPL CALC-SCNC: 11 MMOL/L
APTT BLD: 48.7 SEC
AST SERPL-CCNC: 14 U/L
BILIRUB SERPL-MCNC: 0.3 MG/DL
BUN SERPL-MCNC: 12 MG/DL
CALCIUM SERPL-MCNC: 10 MG/DL
CHLORIDE SERPL-SCNC: 100 MMOL/L
CO2 SERPL-SCNC: 27 MMOL/L
CREAT SERPL-MCNC: 0.69 MG/DL
EGFR: 92 ML/MIN/1.73M2
FERRITIN SERPL-MCNC: 287 NG/ML
FOLATE SERPL-MCNC: 8.2 NG/ML
GLUCOSE SERPL-MCNC: 81 MG/DL
INR PPP: 1.41 RATIO
POTASSIUM SERPL-SCNC: 4 MMOL/L
PROT SERPL-MCNC: 7.2 G/DL
PT BLD: 16.7 SEC
SODIUM SERPL-SCNC: 138 MMOL/L
TSH SERPL-ACNC: 0.6 UIU/ML
VIT B12 SERPL-MCNC: 1916 PG/ML
VWF AG PPP IA-ACNC: 89 %
VWF:RCO ACT/NOR PPP PL AGG: 71 %

## 2022-11-04 LAB — VWF MULTIMERS PPP IA-ACNC: NORMAL

## 2022-11-10 ENCOUNTER — APPOINTMENT (OUTPATIENT)
Dept: HEMATOLOGY ONCOLOGY | Facility: CLINIC | Age: 72
End: 2022-11-10

## 2022-11-10 VITALS
DIASTOLIC BLOOD PRESSURE: 81 MMHG | WEIGHT: 157.8 LBS | BODY MASS INDEX: 26.94 KG/M2 | SYSTOLIC BLOOD PRESSURE: 118 MMHG | OXYGEN SATURATION: 99 % | RESPIRATION RATE: 16 BRPM | HEART RATE: 61 BPM | HEIGHT: 64 IN | TEMPERATURE: 98.1 F

## 2022-11-10 DIAGNOSIS — R23.8 OTHER SKIN CHANGES: ICD-10-CM

## 2022-11-10 PROCEDURE — 99213 OFFICE O/P EST LOW 20 MIN: CPT

## 2022-11-10 NOTE — PHYSICAL EXAM
[Fully active, able to carry on all pre-disease performance without restriction] : Status 0 - Fully active, able to carry on all pre-disease performance without restriction [Normal] : affect appropriate [de-identified] : well appearing female, NAD, pleasant  [de-identified] : scattered moles, especially under inframammary fold

## 2022-11-10 NOTE — RESULTS/DATA
[FreeTextEntry1] : Ms. Briceno presented at age 72 in October 2022 for evaluation of possible von willebrands disease. \par The patient has a medical history of transient AF, hypothyroidism, TRUPTI, GERD, hyperlipidemia and cervical fusion. \par \par Workup for von willebrand disease negative. \par No current evidence of bleeding disorder. \par OK to continue eliquis 5mg BID for afib, she is tolerating this well. \par Can hold eliquis 2-3 days prior to loop recorder placement if needed. \par Elevated ferritin- can check iron studies with PCP and continue to monitor. Normal Hb. \par Return to hematology clinic PRN.

## 2022-11-10 NOTE — HISTORY OF PRESENT ILLNESS
[de-identified] : Referred by: Dr. Kevin Cantrell\par \par Ms. Briceno presented at age 72 in October 2022 for evaluation of possible von willebrands disease. \par The patient has a medical history of transient AF, hypothyroidism, TRUPTI, GERD, hyperlipidemia and cervical fusion. \par \par Presenting HPI: Jackelyn was recently seen by Dr. Cantrell and initiated on eliquis 5mg BID for Afib with RVR. She reports a history of possible von willebrands disease so she was requested to see hematology given concerns for anticoagulation and bleeding risk. She was told many years ago that she may have von willebrand disease because she had easy bruising and occasionally heavy menses. She also suffered from infertility, she was unable to have any biological children and was told she had endometriosis. She underwent a neck fusion in 2010- had some sort of treatment for von willebrand during that surgery, and did not experience any bleeding. She denies any blood in her urine/stools while on the eliquis. She is pending possible placement of a loop recorder. She otherwise feels well, denies fevers, chills, CP, SOB, n/v/d, unintentional weight loss. She has stopped eating caffeine because she thinks this triggers her episodes of afib. \par \par Laboratory studies reviewed at today's visit and notable for: WBC 7.96, Hb 13.1, Plt 213 \par \par HCM: \par - COVID vaccination: s/p 5 doses \par - Colonoscopy: last done 20 years ago\par - Gyn: still screening \par - Mammo: due in April for [facility] \par - Lung cancer screen: does not qualify \par - DEXA: done 2-3 years ago, osteoporosis- on bisphosphonate, repeat test was normal, now off of the medication \par \par SH: \par - Occupation: retired, previously worked as a \par - Living situation: lives in [place], with her , has 2 adopted children \par - Smoking/etoh/illicits: never smoker, rare etoh, denies illicits \par - Exercise: typically physically active, does yoga \par \par FH: \par - No family history of cancer  [de-identified] : Jackelyn presents for follow up on 11/10/22 for possible von willebrand disease. \par \par Labs from 10/26/22 reviewed: CBC wnl\par INR 1.41, PTT 48.7 \par B12, folate sufficient, TSH wnl \par Ferritin 287 (H) \par VWF antigen & activity normal, multimers normal \par \par At today's visit she is feeling well. Remains on eliquis 5mg BID. Her son had a baby and she is going to visit tomorrow. She denies any bleeding or bruising. She is pending loop recorder implant.

## 2022-11-25 NOTE — ASSESSMENT
[FreeTextEntry1] : Arrange implantable loop recorder.  \par Continue beta-blocker statin and anticoagulation.  \par Monitor thyroid replacement.\par Follow-up with hematology regarding possible von Willebrand's.  \par Treat sleep apnea.\par Off ASA \par increase aerobic exercise \par \par atrial fibrillation underwent cardioversion then antiarrhythmic therapy (propafenone) 2012.\par Recurrent atrial fibrillation 5/14/22. \par Reviewed ER documentation local [facility].  Converted w/ IV cardizem. D/C on metop and eliquis. \par Educated patient on pathophysiology of atrial fibrillation and natural progression as well as associated risk of cardioembolic event. Informed her/him on indications for long term anticoagulation. Currently there is no unusual bleeding on NOAC, continue current dose. Reviewed bleeding precautions. \par *note possible history of von Willebrand. CBC stable at present.\par Discussed possibility of ILR for long term monitoring vs external monitoring such as apple watch and Gydget eddie. \par Cont CPAP for TRUPTI. Cont toprol 25mg daily. Cont eliquis 5mg BID interim. \par Thyroid supp with endo. TSH was wnl. \par \par Subclinical atherosclerosis. \par Nuclear stress test normal perfusion 2020.\par \par Primary prevention - statin, BP control, and lifestyle measures. \par Hyperlipidemia, refractory to lifestyle measures. \par Significant family history of accelerated atherosclerosis. \par Continue statin therapy. \par Tolerated without adverse effect. \par \par Any questions and concerns were addressed and resolved. \par

## 2022-11-25 NOTE — HISTORY OF PRESENT ILLNESS
[FreeTextEntry1] : YUE GARCIA  is a 72 year old  F\par \par History of transient AF, hypothyroidism, TRUPTI, GERD, hyperlipidemia and cervical fusion.   \par There is possible von Willebrand disease. \par Family history is notable for accelerated atherosclerosis.  \par \par There is no prior history of a clinical myocardial infarction, coronary revascularization. \par There is no history of symptomatic congestive heart failure rheumatic heart disease \par \par She remains asymptomatic from arrhythmia and cardiovascular standpoint. \par She walks her dog she has been less active with usual exercise and yoga\par She is compliant with her CPAP therapy.  \par \par She was [facility] over the weekend 5/14/22. Staying at [facility] had 1-2 drinks out of the norm for her - rum & coke. During the night felt the need to take her CPAP off, felt an "ache" in her heart She walked up and down the stairs and felt unusually fatigued w/ exertion. She had a friend take her to local ER where she had AF with RVR 130s. She was given IVF and IV cardizem then converted back to NSR. Troponin was negative. She was d/c on metop tart 25 bid and eliquis 5 bid. \par \par Today she denies chest pain, pressure, unusual shortness of breath, lightheadedness, dizziness, near syncope or syncope. \par \par There presently are no bleeding issues.  \par \par Echo 6/2/22 LVEF 55 to 60%, mild MR normal left atrial size normal LV systolic function, normal PASP, mild TR.  No significant change from prior study noted.\par Event monitor May 2022 worn 14 days showed overall normal sinus rhythm with average rate 65 bpm.  Rare ectopy was noted, sequential APCs up to 6 beats.  No recurrent AF was noted.\par ETT showed abnl EKG response then Nuclear stress test performed 11/23/2020 shows likely normal myocardial perfusion, breast attenuation was noted. \par Carotid US 11/16/2020 normal carotid\par Abd US 11/16/2020 no AAA\par Sleep study was performed in January 2013. There was moderate-to-severe obstructive sleep apnea causing sleep disturbed breathing, nocturnal hypoxemia, and snoring.\par \par \par

## 2023-01-05 ENCOUNTER — NON-APPOINTMENT (OUTPATIENT)
Age: 73
End: 2023-01-05

## 2023-01-09 ENCOUNTER — APPOINTMENT (OUTPATIENT)
Dept: CARDIOLOGY | Facility: CLINIC | Age: 73
End: 2023-01-09
Payer: MEDICARE

## 2023-01-09 VITALS — OXYGEN SATURATION: 95 % | BODY MASS INDEX: 27.46 KG/M2 | TEMPERATURE: 97.8 F | HEART RATE: 88 BPM | WEIGHT: 160 LBS

## 2023-01-09 VITALS — DIASTOLIC BLOOD PRESSURE: 50 MMHG | WEIGHT: 16 LBS | BODY MASS INDEX: 2.75 KG/M2 | SYSTOLIC BLOOD PRESSURE: 100 MMHG

## 2023-01-09 PROCEDURE — 99214 OFFICE O/P EST MOD 30 MIN: CPT

## 2023-01-09 NOTE — ASSESSMENT
[FreeTextEntry1] : \par ? raynaud Possibly related to beta-blocker.  I discussed transition to calcium channel blocker.  She prefers not to make any changes.  \par Plan for implantable loop recorder to monitor atrial fibrillation.  This will be scheduled with electrophysiology MELANIA TOMPKINS.\par Continue beta-blocker statin and anticoagulation. \par Monitor thyroid replacement.\par Treat sleep apnea.\par Off ASA \par increase aerobic exercise \par \par atrial fibrillation underwent cardioversion then antiarrhythmic therapy (propafenone) 2012.\par Recurrent atrial fibrillation 5/14/22. \par Reviewed ER documentation local [facility]. Converted w/ IV cardizem. D/C on metop and eliquis. \par Educated patient on pathophysiology of atrial fibrillation and natural progression as well as associated risk of cardioembolic event. \par Reviewed bleeding precautions. \par \par Subclinical atherosclerosis. \par Nuclear stress test normal perfusion 2020.\par \par Primary prevention - statin, BP control, and lifestyle measures. \par Hyperlipidemia, refractory to lifestyle measures. \par Significant family history of accelerated atherosclerosis. \par Continue statin therapy. \par Tolerated without adverse effect. \par \par Any questions and concerns were addressed and resolved. \par \par

## 2023-01-09 NOTE — HISTORY OF PRESENT ILLNESS
[FreeTextEntry1] : YUE GARCIA  is a 72 year old  F\par \par History of transient AF, hypothyroidism, TRUPTI, GERD, hyperlipidemia and cervical fusion. \par compliant with her CPAP therapy. \par Family history is notable for accelerated atherosclerosis. \par \par There is no prior history of a clinical myocardial infarction, coronary revascularization. \par There is no history of symptomatic congestive heart failure rheumatic heart disease \par \par She remains asymptomatic from arrhythmia and cardiovascular standpoint. \par \par [facility] over the weekend 5/14/22. Staying at [facility] had 1-2 drinks out of the norm for her - rum & coke. During the night felt the need to take her CPAP off, felt an "ache" in her heart She walked up and down the stairs and felt unusually fatigued w/ exertion. She had a friend take her to local ER where she had AF with RVR 130s. She was given IVF and IV cardizem then converted back to NSR. Troponin was negative. She was d/c on metop tart 25 bid and eliquis 5 bid. \par \par Today she denies chest pain, pressure, unusual shortness of breath, lightheadedness, dizziness, near syncope or syncope. \par There presently are no bleeding issues. \par \par Saw hematology.  Testing for von Willebrand's disease was negative.  No contraindication to anticoagulation.  \par She does report having cold hands and feet.  \par \par Echo 6/2/22 LVEF 55 to 60%, mild MR normal left atrial size normal LV systolic function, normal PASP, mild TR. No significant change from prior study noted.\par Event monitor May 2022 worn 14 days showed overall normal sinus rhythm with average rate 65 bpm. Rare ectopy was noted, sequential APCs up to 6 beats. No recurrent AF was noted.\par ETT showed abnl EKG response then Nuclear stress test performed 11/23/2020 shows likely normal myocardial perfusion, breast attenuation was noted. \par Carotid US 11/16/2020 normal carotid\par Abd US 11/16/2020 no AAA\par Sleep study was performed in January 2013. There was moderate-to-severe obstructive sleep apnea causing sleep disturbed breathing, nocturnal hypoxemia, and snoring.\par \par

## 2023-04-12 ENCOUNTER — NON-APPOINTMENT (OUTPATIENT)
Age: 73
End: 2023-04-12

## 2023-04-20 ENCOUNTER — RX RENEWAL (OUTPATIENT)
Age: 73
End: 2023-04-20

## 2023-05-15 ENCOUNTER — APPOINTMENT (OUTPATIENT)
Dept: ELECTROPHYSIOLOGY | Facility: CLINIC | Age: 73
End: 2023-05-15
Payer: MEDICARE

## 2023-05-15 VITALS
HEIGHT: 64 IN | SYSTOLIC BLOOD PRESSURE: 122 MMHG | WEIGHT: 158 LBS | DIASTOLIC BLOOD PRESSURE: 60 MMHG | OXYGEN SATURATION: 98 % | BODY MASS INDEX: 26.98 KG/M2 | HEART RATE: 65 BPM

## 2023-05-15 DIAGNOSIS — D68.0 VON WILLEBRAND'S DISEASE: ICD-10-CM

## 2023-05-15 PROCEDURE — 93000 ELECTROCARDIOGRAM COMPLETE: CPT

## 2023-05-15 PROCEDURE — 99214 OFFICE O/P EST MOD 30 MIN: CPT

## 2023-05-15 RX ORDER — DILTIAZEM HYDROCHLORIDE 120 MG/1
120 CAPSULE, EXTENDED RELEASE ORAL DAILY
Qty: 90 | Refills: 1 | Status: DISCONTINUED | COMMUNITY
Start: 2023-01-09 | End: 2023-05-15

## 2023-05-21 ENCOUNTER — NON-APPOINTMENT (OUTPATIENT)
Age: 73
End: 2023-05-21

## 2023-05-21 PROBLEM — D68.0 VON WILLEBRAND DISEASE: Status: ACTIVE | Noted: 2022-10-26

## 2023-05-21 NOTE — HISTORY OF PRESENT ILLNESS
[FreeTextEntry1] : Patient is a 71-year-old woman who is referred for evaluation for possible implantation of an implantable loop monitor.\par \par She has a prior history of possible von Willebrand's disease.  Patient also has a history of hypothyroidism, obstructive sleep apnea previously using CPAP ,  GERD, previous cervical fusion.\par \par Atrial fibrillation history: May 2022 the patient was [facility] when she fell an unusual sensation in her chest.  She went to the local emergency room and atrial fibrillation was noted with rapid ventricular response.  She was treated with IV medications including Cardizem and converted back to normal.  The patient was started on Eliquis as well as metoprolol 25 mg twice daily.  This situation has not reoccurred.  Prior to that her previous episode was approximately 10 years ago.  Of note this last episode of A. fib was preceded by a few more alcohol beverage than she normally would have.\par \par A. fib risk factors: History of sleep apnea.  She has no prior history of hypertension or diabetes.  She is on Synthroid for hypothyroidism.  She has no GI history.\par \par She had an echocardiogram performed 6/2/2022 that showed EF 55 to 60%, left atrial diameter 4.3 cm, mild mitral regurgitation, left atrial volume index 27 cc per metered squared, normal diastolic function, no pericardial effusion, normal pulmonary pressure.\par \par She had a treadmill stress echo performed 6/30/2022 that showed normal electrocardiographic response and normal stress echo with no inducible ischemia.\par \par

## 2023-05-21 NOTE — HISTORY OF PRESENT ILLNESS
[FreeTextEntry1] : Patient is a 71-year-old woman who is referred for evaluation for possible implantation of an implantable loop monitor.\par \par She has a prior history of possible von Willebrand's disease.  Patient also has a history of hypothyroidism, obstructive sleep apnea previously using CPAP ,  GERD, previous cervical fusion.\par \par Atrial fibrillation history: May 2022 the patient was [facility] when she fell an unusual sensation in her chest.  She went to the local emergency room and atrial fibrillation was noted with rapid ventricular response.  She was treated with IV medications including Cardizem and converted back to normal.  The patient was started on Eliquis as well as metoprolol 25 mg twice daily.  This situation has not reoccurred.  Prior to that her previous episode was approximately 10 years ago.  Of note this last episode of A. fib was preceded by a few more alcohol beverage than she normally would have.\par \par A. fib risk factors: History of sleep apnea.  She has no prior history of hypertension or diabetes.  She is on Synthroid for hypothyroidism.  She has no GI history.\par \par She had an echocardiogram performed 6/2/2022 that showed EF 55 to 60%, left atrial diameter 4.3 cm, mild mitral regurgitation, left atrial volume index 27 cc per metered squared, normal diastolic function, no pericardial effusion, normal pulmonary pressure.\par \par She had a treadmill stress echo performed 6/30/2022 that showed normal electrocardiographic response and normal stress echo with no inducible ischemia.\par \par

## 2023-05-21 NOTE — DISCUSSION/SUMMARY
[FreeTextEntry1] : The patient had an episode of A. fib 5/4/2022 following thereafter a few alcoholic beverages more than she normally would have.  She does have prior history of sleep apnea but and uses her CPAP currently.  She had similar episode more than 10 years ago.\par \par Her risk factors for development of atrial fibrillation includes age.  She has no hypertension or diabetes.  She does have a prior history of thyroid disorder but is on replacement therapy with Synthroid.  Sleep apnea could also be potential source of her A. fib and she is using the current mask but its effectiveness may have been overcome by the alcohol intake as well.\par \par She is currently anticoagulated with Eliquis 5 mg twice daily.  Patient is also on rate control medication metoprolol succinate ER 25 mg/day.\par \par Patient was sent for consideration for an implantable loop monitor.  She is currently on anticoagulation with Eliquis.  Patient is not keen on getting the monitor.  I have explained the risk and benefits the patient including monitoring for asymptomatic atrial fibrillation.  She would prefer not to have now because she is concerned about interactions with Bluetooth and her CPAP machine and implantable loop monitor.  I did explain to her the technology including using a link 11 device.  I explained to her the pros and cons of monitoring.  She seemed to understand but would like to defer implantation of implantable loop monitor currently\par \par Patient feels that her A. fib was triggered by the situation she was in I away from home and had  more than usual to drink. [EKG obtained to assist in diagnosis and management of assessed problem(s)] : EKG obtained to assist in diagnosis and management of assessed problem(s)

## 2023-06-12 ENCOUNTER — NON-APPOINTMENT (OUTPATIENT)
Age: 73
End: 2023-06-12

## 2023-06-28 ENCOUNTER — APPOINTMENT (OUTPATIENT)
Dept: CARDIOLOGY | Facility: CLINIC | Age: 73
End: 2023-06-28
Payer: MEDICARE

## 2023-06-28 VITALS
DIASTOLIC BLOOD PRESSURE: 60 MMHG | TEMPERATURE: 97.9 F | HEART RATE: 68 BPM | HEIGHT: 64 IN | OXYGEN SATURATION: 98 % | BODY MASS INDEX: 27.31 KG/M2 | SYSTOLIC BLOOD PRESSURE: 114 MMHG | WEIGHT: 160 LBS

## 2023-06-28 PROCEDURE — 93291 INTERROG DEV EVAL SCRMS IP: CPT

## 2023-07-10 ENCOUNTER — APPOINTMENT (OUTPATIENT)
Dept: CARDIOLOGY | Facility: CLINIC | Age: 73
End: 2023-07-10
Payer: MEDICARE

## 2023-07-10 VITALS
WEIGHT: 160 LBS | HEART RATE: 67 BPM | DIASTOLIC BLOOD PRESSURE: 60 MMHG | OXYGEN SATURATION: 98 % | HEIGHT: 64 IN | BODY MASS INDEX: 27.31 KG/M2 | SYSTOLIC BLOOD PRESSURE: 110 MMHG

## 2023-07-10 DIAGNOSIS — R79.89 OTHER SPECIFIED ABNORMAL FINDINGS OF BLOOD CHEMISTRY: ICD-10-CM

## 2023-07-10 PROCEDURE — 99214 OFFICE O/P EST MOD 30 MIN: CPT

## 2023-07-31 ENCOUNTER — APPOINTMENT (OUTPATIENT)
Dept: CARDIOLOGY | Facility: CLINIC | Age: 73
End: 2023-07-31
Payer: MEDICARE

## 2023-07-31 ENCOUNTER — NON-APPOINTMENT (OUTPATIENT)
Age: 73
End: 2023-07-31

## 2023-07-31 PROCEDURE — 93298 REM INTERROG DEV EVAL SCRMS: CPT

## 2023-07-31 PROCEDURE — G2066: CPT

## 2023-08-14 NOTE — ASSESSMENT
[FreeTextEntry1] : follow-up device check  Fasting blood work has been requested.   will confirm of loop recorder is transmitting properly ? raynaud Possibly related to beta-blocker.  I discussed transition to calcium channel blocker.  She prefers not to make any changes.   Continue beta-blocker statin and anticoagulation.  Monitor thyroid replacement. Treat sleep apnea. Off ASA  increase aerobic exercise   atrial fibrillation underwent cardioversion then antiarrhythmic therapy (propafenone) 2012. Recurrent atrial fibrillation 5/14/22.  Reviewed ER documentation [facility]. Converted w/ IV cardizem. D/C on metop and eliquis.  Educated patient on pathophysiology of atrial fibrillation and natural progression as well as associated risk of cardioembolic event.  Reviewed bleeding precautions.   Subclinical atherosclerosis.  Nuclear stress test normal perfusion 2020.  Primary prevention - statin, BP control, and lifestyle measures.  Hyperlipidemia, refractory to lifestyle measures.  Significant family history of accelerated atherosclerosis.  Continue statin therapy.  Tolerated without adverse effect.   Any questions and concerns were addressed and resolved.

## 2023-08-14 NOTE — HISTORY OF PRESENT ILLNESS
[FreeTextEntry1] : YUE GARCIA  is a 72 year old  F  History of AF, hypothyroidism, TRUPTI, GERD, hyperlipidemia and cervical fusion.  compliant with her CPAP therapy.  ILR Family history is notable for accelerated atherosclerosis.   There is no prior history of a clinical myocardial infarction, coronary revascularization.  There is no history of symptomatic congestive heart failure rheumatic heart disease   She remains asymptomatic from arrhythmia and cardiovascular standpoint.   [facility] over the weekend 5/14/22. Staying at [facility] had 1-2 drinks out of the norm for her - rum & coke. During the night felt the need to take her CPAP off, felt an "ache" in her heart She walked up and down the stairs and felt unusually fatigued w/ exertion. She had a friend take her to local ER where she had AF with RVR 130s. She was given IVF and IV cardizem then converted back to NSR. Troponin was negative. She was d/c on metop tart 25 bid and eliquis 5 bid.   Today she denies chest pain, pressure, unusual shortness of breath, lightheadedness, dizziness, near syncope or syncope.  There presently are no bleeding issues.   Saw hematology.  Testing for von Willebrand's disease was negative.  No contraindication to anticoagulation.   She does report having cold hands and feet.   now has lr She does have easy bruising.  She remains on anticoagulation.   March 2023 potassium 3.7 creatinine 0.6 LDL 62  last EKG sinus rhythm   Echo 6/2/22 LVEF 55 to 60%, mild MR normal left atrial size normal LV systolic function, normal PASP, mild TR. No significant change from prior study noted. Event monitor May 2022 worn 14 days showed overall normal sinus rhythm with average rate 65 bpm. Rare ectopy was noted, sequential APCs up to 6 beats. No recurrent AF was noted. ETT showed abnl EKG response then Nuclear stress test performed 11/23/2020 shows likely normal myocardial perfusion, breast attenuation was noted.  Carotid US 11/16/2020 normal carotid Abd US 11/16/2020 no AAA Sleep study was performed in January 2013. There was moderate-to-severe obstructive sleep apnea causing sleep disturbed breathing, nocturnal hypoxemia, and snoring.

## 2023-08-16 ENCOUNTER — OFFICE (OUTPATIENT)
Dept: URBAN - METROPOLITAN AREA CLINIC 8 | Facility: CLINIC | Age: 73
Setting detail: OPHTHALMOLOGY
End: 2023-08-16
Payer: MEDICARE

## 2023-08-16 DIAGNOSIS — H25.13: ICD-10-CM

## 2023-08-16 DIAGNOSIS — H02.834: ICD-10-CM

## 2023-08-16 DIAGNOSIS — H01.004: ICD-10-CM

## 2023-08-16 DIAGNOSIS — H01.001: ICD-10-CM

## 2023-08-16 DIAGNOSIS — H02.831: ICD-10-CM

## 2023-08-16 DIAGNOSIS — H01.005: ICD-10-CM

## 2023-08-16 DIAGNOSIS — H16.223: ICD-10-CM

## 2023-08-16 DIAGNOSIS — H01.002: ICD-10-CM

## 2023-08-16 PROCEDURE — 92014 COMPRE OPH EXAM EST PT 1/>: CPT | Performed by: OPHTHALMOLOGY

## 2023-08-16 ASSESSMENT — REFRACTION_MANIFEST
OS_ADD: +2.25
OU_VA: 20/15
OD_ADD: +2.25
OD_AXIS: 050
OS_VA2: 20/20
OU_VA: 20/15
OS_VA2: 20/20
OS_SPHERE: +2.00
OS_CYLINDER: SPH
OD_CYLINDER: -0.50
OS_VA1: 20/20
OS_ADD: +2.25
OD_ADD: +2.25
OD_CYLINDER: -0.50
OD_SPHERE: +2.00
OS_CYLINDER: SPH
OD_VA1: 20/20+3
OD_VA2: 20/20
OD_SPHERE: +2.00
OS_SPHERE: +2.00
OD_AXIS: 050
OD_VA2: 20/20
OD_VA1: 20/20+3
OS_VA1: 20/20

## 2023-08-16 ASSESSMENT — SPHEQUIV_DERIVED
OD_SPHEQUIV: 1.75
OD_SPHEQUIV: 1.75
OD_SPHEQUIV: 2.125
OS_SPHEQUIV: 2.25

## 2023-08-16 ASSESSMENT — REFRACTION_AUTOREFRACTION
OD_CYLINDER: -0.75
OS_AXIS: 114
OS_CYLINDER: -0.50
OS_SPHERE: +2.50
OD_SPHERE: +2.50
OD_AXIS: 050

## 2023-08-16 ASSESSMENT — REFRACTION_CURRENTRX
OD_OVR_VA: 20/
OD_SPHERE: +2.00
OD_AXIS: 038
OD_ADD: +2.25
OS_VPRISM_DIRECTION: PROGS
OD_VPRISM_DIRECTION: PROGS
OS_OVR_VA: 20/
OD_CYLINDER: -0.50
OS_SPHERE: +1.75
OS_ADD: +2.25
OS_AXIS: 132
OS_CYLINDER: -0.25

## 2023-08-16 ASSESSMENT — VISUAL ACUITY
OD_BCVA: 20/25
OS_BCVA: 20/20+3

## 2023-08-16 ASSESSMENT — AXIALLENGTH_DERIVED
OD_AL: 23.2288
OD_AL: 23.3713
OS_AL: 23.0495
OD_AL: 23.3713

## 2023-08-16 ASSESSMENT — KERATOMETRY
METHOD_AUTO_MANUAL: AUTO
OD_AXISANGLE_DEGREES: 101
OS_AXISANGLE_DEGREES: 087
OS_K2POWER_DIOPTERS: 43.00
OD_K2POWER_DIOPTERS: 42.75
OD_K1POWER_DIOPTERS: 41.75
OS_K1POWER_DIOPTERS: 42.25

## 2023-08-16 ASSESSMENT — SUPERFICIAL PUNCTATE KERATITIS (SPK)
OD_SPK: T
OS_SPK: T

## 2023-08-16 ASSESSMENT — CONFRONTATIONAL VISUAL FIELD TEST (CVF)
OD_FINDINGS: FULL
OS_FINDINGS: FULL

## 2023-08-16 ASSESSMENT — TEAR BREAK UP TIME (TBUT)
OS_TBUT: 8 SEC
OD_TBUT: 8 SEC

## 2023-08-16 ASSESSMENT — LID EXAM ASSESSMENTS
OD_BLEPHARITIS: RLL RUL T
OS_BLEPHARITIS: LLL LUL T

## 2023-08-16 ASSESSMENT — LID POSITION - DERMATOCHALASIS
OS_DERMATOCHALASIS: T
OD_DERMATOCHALASIS: T

## 2023-09-01 ENCOUNTER — NON-APPOINTMENT (OUTPATIENT)
Age: 73
End: 2023-09-01

## 2023-09-01 ENCOUNTER — APPOINTMENT (OUTPATIENT)
Dept: CARDIOLOGY | Facility: CLINIC | Age: 73
End: 2023-09-01
Payer: MEDICARE

## 2023-09-02 PROCEDURE — G2066: CPT

## 2023-09-02 PROCEDURE — 93298 REM INTERROG DEV EVAL SCRMS: CPT

## 2023-10-06 ENCOUNTER — NON-APPOINTMENT (OUTPATIENT)
Age: 73
End: 2023-10-06

## 2023-10-06 ENCOUNTER — APPOINTMENT (OUTPATIENT)
Dept: CARDIOLOGY | Facility: CLINIC | Age: 73
End: 2023-10-06
Payer: MEDICARE

## 2023-10-06 PROCEDURE — G2066: CPT

## 2023-10-06 PROCEDURE — 93298 REM INTERROG DEV EVAL SCRMS: CPT

## 2023-11-09 ENCOUNTER — NON-APPOINTMENT (OUTPATIENT)
Age: 73
End: 2023-11-09

## 2023-11-09 ENCOUNTER — APPOINTMENT (OUTPATIENT)
Dept: CARDIOLOGY | Facility: CLINIC | Age: 73
End: 2023-11-09
Payer: MEDICARE

## 2023-11-10 PROCEDURE — G2066: CPT | Mod: NC

## 2023-11-10 PROCEDURE — 93298 REM INTERROG DEV EVAL SCRMS: CPT | Mod: NC

## 2023-11-24 ENCOUNTER — RX RENEWAL (OUTPATIENT)
Age: 73
End: 2023-11-24

## 2023-12-08 ENCOUNTER — NON-APPOINTMENT (OUTPATIENT)
Age: 73
End: 2023-12-08

## 2023-12-15 ENCOUNTER — NON-APPOINTMENT (OUTPATIENT)
Age: 73
End: 2023-12-15

## 2023-12-15 ENCOUNTER — APPOINTMENT (OUTPATIENT)
Dept: CARDIOLOGY | Facility: CLINIC | Age: 73
End: 2023-12-15
Payer: MEDICARE

## 2023-12-16 PROCEDURE — G2066: CPT

## 2023-12-16 PROCEDURE — 93298 REM INTERROG DEV EVAL SCRMS: CPT

## 2023-12-17 NOTE — HISTORY OF PRESENT ILLNESS
[FreeTextEntry1] : YUE GARCIA  is a 73 year old  F  History of AF, hypothyroidism, TRUPTI, GERD, hyperlipidemia and cervical fusion.  compliant with her CPAP therapy.  ILR Family history is notable for accelerated atherosclerosis.   There is no prior history of a clinical myocardial infarction, coronary revascularization.  There is no history of symptomatic congestive heart failure rheumatic heart disease   She remains asymptomatic from arrhythmia and cardiovascular standpoint.   [facility] over the weekend 5/14/22. Staying at [facility] had 1-2 drinks out of the norm for her - rum & coke. During the night felt the need to take her CPAP off, felt an "ache" in her heart She walked up and down the stairs and felt unusually fatigued w/ exertion. She had a friend take her to local ER where she had AF with RVR 130s. She was given IVF and IV cardizem then converted back to NSR. Troponin was negative. She was d/c on metop tart 25 bid and eliquis 5 bid.   Today she denies chest pain, pressure, unusual shortness of breath, lightheadedness, dizziness, near syncope or syncope.  There presently are no bleeding issues.   Saw hematology.  Testing for von Willebrand's disease was negative.  No contraindication to anticoagulation.   She does report having cold hands and feet.   now has lr She does have easy bruising.  She remains on anticoagulation.   March 2023 potassium 3.7 creatinine 0.6 LDL 62  last EKG sinus rhythm   Echo 6/2/22 LVEF 55 to 60%, mild MR normal left atrial size normal LV systolic function, normal PASP, mild TR. No significant change from prior study noted. Event monitor May 2022 worn 14 days showed overall normal sinus rhythm with average rate 65 bpm. Rare ectopy was noted, sequential APCs up to 6 beats. No recurrent AF was noted. ETT showed abnl EKG response then Nuclear stress test performed 11/23/2020 shows likely normal myocardial perfusion, breast attenuation was noted.  Carotid US 11/16/2020 normal carotid Abd US 11/16/2020 no AAA Sleep study was performed in January 2013. There was moderate-to-severe obstructive sleep apnea causing sleep disturbed breathing, nocturnal hypoxemia, and snoring.

## 2023-12-17 NOTE — HISTORY OF PRESENT ILLNESS
[FreeTextEntry1] : YUE GARCIA  is a 73 year old  F  History of AF, hypothyroidism, TRUPTI, GERD, hyperlipidemia and cervical fusion.  compliant with her CPAP therapy.  ILR Family history is notable for accelerated atherosclerosis.   There is no prior history of a clinical myocardial infarction, coronary revascularization.  There is no history of symptomatic congestive heart failure rheumatic heart disease   She remains asymptomatic from arrhythmia and cardiovascular standpoint.   [facility] over the weekend 5/14/22. Staying at [facility]"" had 1-2 drinks out of the norm for her - rum & coke. During the night felt the need to take her CPAP off, felt an "ache" in her heart She walked up and down the stairs and felt unusually fatigued w/ exertion. She had a friend take her to local ER where she had AF with RVR 130s. She was given IVF and IV cardizem then converted back to NSR. Troponin was negative. She was d/c on metop tart 25 bid and eliquis 5 bid.   Today she denies chest pain, pressure, unusual shortness of breath, lightheadedness, dizziness, near syncope or syncope.  There presently are no bleeding issues.   Saw hematology.  Testing for von Willebrand's disease was negative.  No contraindication to anticoagulation.   She does report having cold hands and feet.   now has lr She does have easy bruising.  She remains on anticoagulation.   March 2023 potassium 3.7 creatinine 0.6 LDL 62  last EKG sinus rhythm   Echo 6/2/22 LVEF 55 to 60%, mild MR normal left atrial size normal LV systolic function, normal PASP, mild TR. No significant change from prior study noted. Event monitor May 2022 worn 14 days showed overall normal sinus rhythm with average rate 65 bpm. Rare ectopy was noted, sequential APCs up to 6 beats. No recurrent AF was noted. ETT showed abnl EKG response then Nuclear stress test performed 11/23/2020 shows likely normal myocardial perfusion, breast attenuation was noted.  Carotid US 11/16/2020 normal carotid Abd US 11/16/2020 no AAA Sleep study was performed in January 2013. There was moderate-to-severe obstructive sleep apnea causing sleep disturbed breathing, nocturnal hypoxemia, and snoring.

## 2023-12-18 ENCOUNTER — NON-APPOINTMENT (OUTPATIENT)
Age: 73
End: 2023-12-18

## 2023-12-18 ENCOUNTER — APPOINTMENT (OUTPATIENT)
Dept: CARDIOLOGY | Facility: CLINIC | Age: 73
End: 2023-12-18
Payer: MEDICARE

## 2023-12-18 VITALS
SYSTOLIC BLOOD PRESSURE: 116 MMHG | BODY MASS INDEX: 28.68 KG/M2 | WEIGHT: 168 LBS | OXYGEN SATURATION: 100 % | HEART RATE: 83 BPM | DIASTOLIC BLOOD PRESSURE: 58 MMHG | HEIGHT: 64 IN

## 2023-12-18 DIAGNOSIS — I48.0 PAROXYSMAL ATRIAL FIBRILLATION: ICD-10-CM

## 2023-12-18 PROCEDURE — 99214 OFFICE O/P EST MOD 30 MIN: CPT

## 2023-12-18 PROCEDURE — 93000 ELECTROCARDIOGRAM COMPLETE: CPT

## 2023-12-18 NOTE — ASSESSMENT
[FreeTextEntry1] :  Medications have been refilled.   Reminded to take her transmitter.   Fasting blood work has been requested before next office visit.   Discussed use of cardiac CTA with her significant family history of cardiovascular disease. follow-up device check Continue beta-blocker statin and anticoagulation. Monitor thyroid replacement. Treat sleep apnea. increase aerobic exercise  atrial fibrillation underwent cardioversion then antiarrhythmic therapy (propafenone) 2012. Recurrent atrial fibrillation 5/14/22. Reviewed ER documentation [facility]. Converted w/ IV cardizem. D/C on metop and eliquis. Educated patient on pathophysiology of atrial fibrillation and natural progression as well as associated risk of cardioembolic event. Reviewed bleeding precautions.  Subclinical atherosclerosis. Nuclear stress test normal perfusion 2020.  Primary prevention - statin, BP control, and lifestyle measures. Hyperlipidemia, refractory to lifestyle measures. Significant family history of accelerated atherosclerosis. Continue statin therapy. Tolerated without adverse effect.  Any questions and concerns were addressed and resolved.

## 2023-12-18 NOTE — HISTORY OF PRESENT ILLNESS
[FreeTextEntry1] : YUE GARCIA  is a 73 year old  F  History of AF, hypothyroidism, TRUPTI, GERD, hyperlipidemia and cervical fusion.  compliant with her CPAP therapy.  ILR Family history is notable for accelerated atherosclerosis.   There is no prior history of a clinical myocardial infarction, coronary revascularization.  There is no history of symptomatic congestive heart failure rheumatic heart disease   [facility] over the weekend 5/14/22. Staying at [facility] had 1-2 drinks out of the norm for her - rum & coke. During the night felt the need to take her CPAP off, felt an "ache" in her heart She walked up and down the stairs and felt unusually fatigued w/ exertion. She had a friend take her to local ER where she had AF with RVR 130s. She was given IVF and IV cardizem then converted back to NSR. Troponin was negative. She was d/c on metop tart 25 bid and eliquis 5 bid.   She remains asymptomatic from arrhythmia and cardiovascular standpoint.   Today she denies chest pain, pressure, unusual shortness of breath, lightheadedness, dizziness, near syncope or syncope.  There presently are no bleeding issues.   Saw hematology.  Testing for von Willebrand's disease was negative.  No contraindication to anticoagulation.   She does have easy bruising.  She remains on anticoagulation.  Thyroid medication has been adjusted by her endocrinologist.   She is heading to [place] for the winter months.    Recent loop recorder checks have been reviewed.   Blood work December 2023 hemoglobin 12.2 creatinine 0.7  EKG demonstrates normal sinus rhythm  March 2023 potassium 3.7 creatinine 0.6 LDL 62  Echo 6/2/22 LVEF 55 to 60%, mild MR normal left atrial size normal LV systolic function, normal PASP, mild TR. No significant change from prior study noted. ETT showed abnl EKG response then Nuclear stress test performed 11/23/2020 shows likely normal myocardial perfusion, breast attenuation was noted.  Carotid US 11/16/2020 normal carotid Abd US 11/16/2020 no AAA Sleep study was performed in January 2013. There was moderate-to-severe obstructive sleep apnea causing sleep disturbed breathing, nocturnal hypoxemia, and snoring.

## 2024-01-19 ENCOUNTER — NON-APPOINTMENT (OUTPATIENT)
Age: 74
End: 2024-01-19

## 2024-01-19 ENCOUNTER — APPOINTMENT (OUTPATIENT)
Dept: CARDIOLOGY | Facility: CLINIC | Age: 74
End: 2024-01-19
Payer: MEDICARE

## 2024-01-19 PROCEDURE — 93298 REM INTERROG DEV EVAL SCRMS: CPT

## 2024-02-23 ENCOUNTER — NON-APPOINTMENT (OUTPATIENT)
Age: 74
End: 2024-02-23

## 2024-02-23 ENCOUNTER — APPOINTMENT (OUTPATIENT)
Dept: CARDIOLOGY | Facility: CLINIC | Age: 74
End: 2024-02-23
Payer: MEDICARE

## 2024-02-23 PROCEDURE — 93298 REM INTERROG DEV EVAL SCRMS: CPT

## 2024-03-29 ENCOUNTER — APPOINTMENT (OUTPATIENT)
Dept: CARDIOLOGY | Facility: CLINIC | Age: 74
End: 2024-03-29
Payer: MEDICARE

## 2024-03-29 ENCOUNTER — NON-APPOINTMENT (OUTPATIENT)
Age: 74
End: 2024-03-29

## 2024-03-29 PROCEDURE — 93298 REM INTERROG DEV EVAL SCRMS: CPT

## 2024-05-03 ENCOUNTER — APPOINTMENT (OUTPATIENT)
Dept: CARDIOLOGY | Facility: CLINIC | Age: 74
End: 2024-05-03
Payer: MEDICARE

## 2024-05-03 ENCOUNTER — NON-APPOINTMENT (OUTPATIENT)
Age: 74
End: 2024-05-03

## 2024-05-03 PROCEDURE — 93298 REM INTERROG DEV EVAL SCRMS: CPT

## 2024-06-17 ENCOUNTER — APPOINTMENT (OUTPATIENT)
Dept: CARDIOLOGY | Facility: CLINIC | Age: 74
End: 2024-06-17
Payer: MEDICARE

## 2024-06-17 VITALS
HEIGHT: 64 IN | DIASTOLIC BLOOD PRESSURE: 68 MMHG | OXYGEN SATURATION: 98 % | BODY MASS INDEX: 27.66 KG/M2 | HEART RATE: 65 BPM | RESPIRATION RATE: 21 BRPM | WEIGHT: 162 LBS | SYSTOLIC BLOOD PRESSURE: 122 MMHG

## 2024-06-17 DIAGNOSIS — Z95.818 PRESENCE OF OTHER CARDIAC IMPLANTS AND GRAFTS: ICD-10-CM

## 2024-06-17 DIAGNOSIS — G47.33 OBSTRUCTIVE SLEEP APNEA (ADULT) (PEDIATRIC): ICD-10-CM

## 2024-06-17 DIAGNOSIS — E78.5 HYPERLIPIDEMIA, UNSPECIFIED: ICD-10-CM

## 2024-06-17 PROCEDURE — 99214 OFFICE O/P EST MOD 30 MIN: CPT

## 2024-06-17 RX ORDER — ATORVASTATIN CALCIUM 10 MG/1
10 TABLET, FILM COATED ORAL
Qty: 90 | Refills: 3 | Status: ACTIVE | COMMUNITY
Start: 2018-11-02 | End: 1900-01-01

## 2024-06-17 RX ORDER — APIXABAN 5 MG/1
5 TABLET, FILM COATED ORAL
Qty: 180 | Refills: 1 | Status: ACTIVE | COMMUNITY
Start: 2023-11-24 | End: 1900-01-01

## 2024-06-17 RX ORDER — METOPROLOL SUCCINATE 25 MG/1
25 TABLET, EXTENDED RELEASE ORAL
Qty: 90 | Refills: 3 | Status: ACTIVE | COMMUNITY
Start: 2022-05-17 | End: 1900-01-01

## 2024-06-17 RX ORDER — CALCIUM CARBONATE/VITAMIN D3 600MG-5MCG
600-200 TABLET ORAL
Refills: 0 | Status: ACTIVE | COMMUNITY

## 2024-06-19 ENCOUNTER — APPOINTMENT (OUTPATIENT)
Dept: CARDIOLOGY | Facility: CLINIC | Age: 74
End: 2024-06-19
Payer: MEDICARE

## 2024-06-19 VITALS
OXYGEN SATURATION: 97 % | BODY MASS INDEX: 27.81 KG/M2 | SYSTOLIC BLOOD PRESSURE: 90 MMHG | HEART RATE: 70 BPM | WEIGHT: 162 LBS | DIASTOLIC BLOOD PRESSURE: 72 MMHG

## 2024-06-19 DIAGNOSIS — I48.91 UNSPECIFIED ATRIAL FIBRILLATION: ICD-10-CM

## 2024-06-19 PROCEDURE — 93291 INTERROG DEV EVAL SCRMS IP: CPT

## 2024-06-19 RX ORDER — LEVOTHYROXINE SODIUM 100 UG/1
100 TABLET ORAL
Refills: 0 | Status: ACTIVE | COMMUNITY

## 2024-06-19 NOTE — HISTORY OF PRESENT ILLNESS
[FreeTextEntry1] : YUE GARCIA  is a 73 year old  F  History of AF, hypothyroidism, TRUPTI, GERD, hyperlipidemia and cervical fusion. compliant with her CPAP therapy. ILR Family history is notable for accelerated atherosclerosis.  There is no prior history of a clinical myocardial infarction, coronary revascularization. There is no history of symptomatic congestive heart failure rheumatic heart disease  [facility] over the weekend 5/14/22. Staying at [facility] had 1-2 drinks out of the norm for her - rum & coke. During the night felt the need to take her CPAP off, felt an "ache" in her heart She walked up and down the stairs and felt unusually fatigued w/ exertion. She had a friend take her to local ER where she had AF with RVR 130s. She was given IVF and IV cardizem then converted back to NSR. Troponin was negative. She was d/c on metop tart 25 bid and eliquis 5 bid.  Today she denies chest pain, pressure, unusual shortness of breath, lightheadedness, dizziness, near syncope or syncope. Saw hematology. Testing for von Willebrand's disease was negative. No contraindication to anticoagulation. She does have easy bruising. She remains on anticoagulation.  Last seen December 2023.  In the interim she had an upper respiratory infection. She had palpitations. Loop recorder demonstrated atrial fibrillation. Episode lasted about 6 hours with rapid rates. Paroxysmal atrial fibrillation blood work June 2024 hemoglobin 12.0 potassium 3.5 creatinine 0.7 LDL 63 CPK 46   Echo 6/2/22 LVEF 55 to 60%, mild MR normal left atrial size normal LV systolic function, normal PASP, mild TR. No significant change from prior study noted. ETT showed abnl EKG response then Nuclear stress test performed 11/23/2020 shows likely normal myocardial perfusion, breast attenuation was noted. Carotid US 11/16/2020 normal carotid Abd US 11/16/2020 no AAA Sleep study was performed in January 2013. There was moderate-to-severe obstructive sleep apnea causing sleep disturbed breathing, nocturnal hypoxemia, and snoring.

## 2024-06-19 NOTE — PROCEDURE
[de-identified] : [employer] [de-identified] : Linq II [de-identified] : [id number] [de-identified] : 06/20/23 [de-identified] : PAF noted in February.  False pause in March.  Now on AC. No episodes since.   Pt has dizziness and occasionally feels weakness in her arms and legs.   Demonstrated how to use symptom activator today.   She is scheduled to follow up with Dr. Walters.    Q 1 month MAYE.  F/U general cardiologist and EP as scheduled.

## 2024-06-19 NOTE — ASSESSMENT
[FreeTextEntry1] : Loop recorder checks and blood work have been reviewed  records have been requested from [place].  Anticoagulation has been refilled.  Symptomatic paroxysmal atrial fibrillation. Requested electrophysiology evaluation regarding long-term rhythm control strategy.  The episodes of atrial fibrillation have required ER evaluation and management.  She is concerned about the atrial fibrillation episodes as they impact her quality of life  follow-up device check Continue beta-blocker statin and anticoagulation. Monitor thyroid replacement. Treat sleep apnea. increase aerobic exercise  atrial fibrillation underwent cardioversion then antiarrhythmic therapy (propafenone) 2012. Recurrent atrial fibrillation 5/22 2/24. Reviewed ER documentation [facility]. Converted w/ IV cardizem. D/C on metop and eliquis. Educated patient on pathophysiology of atrial fibrillation and natural progression as well as associated risk of cardioembolic event. Reviewed bleeding precautions.  Subclinical atherosclerosis. Nuclear stress test normal perfusion 2020.  Primary prevention - statin, BP control, and lifestyle measures. Hyperlipidemia, refractory to lifestyle measures. Significant family history of accelerated atherosclerosis. Continue statin therapy. Tolerated without adverse effect.  Any questions and concerns were addressed and resolved.

## 2024-06-19 NOTE — ADDENDUM
[FreeTextEntry1] : EKG obtained to assist in the diagnosis and management of assisted problem(s).  Outside records reviewed from [facility]. Symptoms of sinus congestion. Palpitations. Rapid atrial fibrillation

## 2024-06-20 ENCOUNTER — RX RENEWAL (OUTPATIENT)
Age: 74
End: 2024-06-20

## 2024-07-24 ENCOUNTER — NON-APPOINTMENT (OUTPATIENT)
Age: 74
End: 2024-07-24

## 2024-07-24 ENCOUNTER — APPOINTMENT (OUTPATIENT)
Dept: CARDIOLOGY | Facility: CLINIC | Age: 74
End: 2024-07-24

## 2024-07-24 PROCEDURE — 93298 REM INTERROG DEV EVAL SCRMS: CPT

## 2024-08-28 ENCOUNTER — NON-APPOINTMENT (OUTPATIENT)
Age: 74
End: 2024-08-28

## 2024-08-28 ENCOUNTER — APPOINTMENT (OUTPATIENT)
Dept: CARDIOLOGY | Facility: CLINIC | Age: 74
End: 2024-08-28
Payer: MEDICARE

## 2024-08-28 PROCEDURE — 93298 REM INTERROG DEV EVAL SCRMS: CPT

## 2024-09-11 ENCOUNTER — OFFICE (OUTPATIENT)
Dept: URBAN - METROPOLITAN AREA CLINIC 8 | Facility: CLINIC | Age: 74
Setting detail: OPHTHALMOLOGY
End: 2024-09-11
Payer: MEDICARE

## 2024-09-11 DIAGNOSIS — H01.004: ICD-10-CM

## 2024-09-11 DIAGNOSIS — H25.13: ICD-10-CM

## 2024-09-11 DIAGNOSIS — H16.223: ICD-10-CM

## 2024-09-11 DIAGNOSIS — H02.831: ICD-10-CM

## 2024-09-11 DIAGNOSIS — H02.834: ICD-10-CM

## 2024-09-11 DIAGNOSIS — H01.001: ICD-10-CM

## 2024-09-11 DIAGNOSIS — H01.005: ICD-10-CM

## 2024-09-11 DIAGNOSIS — H01.002: ICD-10-CM

## 2024-09-11 PROCEDURE — 92014 COMPRE OPH EXAM EST PT 1/>: CPT | Performed by: OPHTHALMOLOGY

## 2024-09-11 ASSESSMENT — CONFRONTATIONAL VISUAL FIELD TEST (CVF)
OS_FINDINGS: FULL
OD_FINDINGS: FULL

## 2024-09-11 ASSESSMENT — LID POSITION - DERMATOCHALASIS
OD_DERMATOCHALASIS: T
OS_DERMATOCHALASIS: T

## 2024-09-11 ASSESSMENT — LID EXAM ASSESSMENTS
OS_BLEPHARITIS: LLL LUL T
OD_BLEPHARITIS: RLL RUL T

## 2024-09-23 ENCOUNTER — APPOINTMENT (OUTPATIENT)
Dept: CARDIOLOGY | Facility: CLINIC | Age: 74
End: 2024-09-23
Payer: MEDICARE

## 2024-09-23 ENCOUNTER — NON-APPOINTMENT (OUTPATIENT)
Age: 74
End: 2024-09-23

## 2024-09-23 VITALS
HEART RATE: 67 BPM | DIASTOLIC BLOOD PRESSURE: 56 MMHG | HEIGHT: 64 IN | OXYGEN SATURATION: 96 % | SYSTOLIC BLOOD PRESSURE: 104 MMHG | WEIGHT: 157 LBS | BODY MASS INDEX: 26.8 KG/M2

## 2024-09-23 DIAGNOSIS — E78.5 HYPERLIPIDEMIA, UNSPECIFIED: ICD-10-CM

## 2024-09-23 DIAGNOSIS — Z95.818 PRESENCE OF OTHER CARDIAC IMPLANTS AND GRAFTS: ICD-10-CM

## 2024-09-23 DIAGNOSIS — I48.91 UNSPECIFIED ATRIAL FIBRILLATION: ICD-10-CM

## 2024-09-23 PROCEDURE — 93000 ELECTROCARDIOGRAM COMPLETE: CPT

## 2024-09-23 PROCEDURE — 99214 OFFICE O/P EST MOD 30 MIN: CPT

## 2024-09-23 RX ORDER — LEVOTHYROXINE SODIUM 0.1 MG/1
100 TABLET ORAL
Refills: 0 | Status: ACTIVE | COMMUNITY

## 2024-09-23 NOTE — HISTORY OF PRESENT ILLNESS
[FreeTextEntry1] : YUE GARCIA  is a 74 year old  F L last visit referred to electrophysiology.  There is discussion of ablation.  Will connect with electrophysiologist regarding timing of ablation.  This may be complicated by her scheduled winter travel.  Fortunately her recent device checks have demonstrated sinus rhythm.  Today's EKG demonstrates normal sinus rhythm.  EKG reviewed.  Loop recorder check reviewed.  Monitor thyroid replacement.  EP follow-up.  History of AF, hypothyroidism, TRUPTI, GERD, hyperlipidemia and cervical fusion. compliant with her CPAP therapy. ILR Family history is notable for accelerated atherosclerosis.  [facility] over the weekend 5/14/22. Staying at [facility] had 1-2 drinks out of the norm for her - rum & coke. During the night felt the need to take her CPAP off, felt an "ache" in her heart She walked up and down the stairs and felt unusually fatigued w/ exertion. She had a friend take her to local ER where she had AF with RVR 130s. She was given IVF and IV cardizem then converted back to NSR. Troponin was negative. She was d/c on metop tart 25 bid and eliquis 5 bid.  Today she denies chest pain, pressure, unusual shortness of breath, lightheadedness, dizziness, near syncope or syncope. Saw hematology. Testing for von Willebrand's disease was negative. No contraindication to anticoagulation. She does have easy bruising. She remains on anticoagulation.  Last seen December 2023.  In the interim she had an upper respiratory infection. She had palpitations. Loop recorder demonstrated atrial fibrillation. Episode lasted about 6 hours with rapid rates. Paroxysmal atrial fibrillation blood work June 2024 hemoglobin 12.0 potassium 3.5 creatinine 0.7 LDL 63 CPK 46   Echo 6/2/22 LVEF 55 to 60%, mild MR normal left atrial size normal LV systolic function, normal PASP, mild TR. No significant change from prior study noted. ETT showed abnl EKG response then Nuclear stress test performed 11/23/2020 shows likely normal myocardial perfusion, breast attenuation was noted. Carotid US 11/16/2020 normal carotid Abd US 11/16/2020 no AAA Sleep study was performed in January 2013. There was moderate-to-severe obstructive sleep apnea causing sleep disturbed breathing, nocturnal hypoxemia, and snoring.  Symptomatic paroxysmal atrial fibrillation. electrophysiology evaluation regarding long-term rhythm control strategy.  The episodes of atrial fibrillation have required ER evaluation and management.  She is concerned about the atrial fibrillation episodes as they impact her quality of life  follow-up device check Continue beta-blocker statin and anticoagulation. Monitor thyroid replacement. Treat sleep apnea. increase aerobic exercise  atrial fibrillation underwent cardioversion then antiarrhythmic therapy (propafenone) 2012. Recurrent atrial fibrillation 5/22 2/24. Reviewed ER documentation [facility]. Converted w/ IV cardizem. D/C on metop and eliquis. Educated patient on pathophysiology of atrial fibrillation and natural progression as well as associated risk of cardioembolic event. Reviewed bleeding precautions.  Subclinical atherosclerosis. Nuclear stress test normal perfusion 2020.  Primary prevention - statin, BP control, and lifestyle measures. Hyperlipidemia, refractory to lifestyle measures. Significant family history of accelerated atherosclerosis. Continue statin therapy. Tolerated without adverse effect.  Any questions and concerns were addressed and resolved.

## 2024-09-23 NOTE — HISTORY OF PRESENT ILLNESS
[FreeTextEntry1] : YUE GARCIA  is a 74 year old  F L last visit referred to electrophysiology.  There is discussion of ablation.  Will connect with electrophysiologist regarding timing of ablation.  This may be complicated by her scheduled winter travel.  Fortunately her recent device checks have demonstrated sinus rhythm.  Today's EKG demonstrates normal sinus rhythm.  EKG reviewed.  Loop recorder check reviewed.  Monitor thyroid replacement.  EP follow-up.  History of AF, hypothyroidism, TRUPTI, GERD, hyperlipidemia and cervical fusion. compliant with her CPAP therapy. ILR Family history is notable for accelerated atherosclerosis.  [facility] over the weekend 5/14/22. Staying at [facility] had 1-2 drinks out of the norm for her - rum & coke. During the night felt the need to take her CPAP off, felt an "ache" in her heart She walked up and down the stairs and felt unusually fatigued w/ exertion. She had a friend take her to local ER where she had AF with RVR 130s. She was given IVF and IV cardizem then converted back to NSR. Troponin was negative. She was d/c on metop tart 25 bid and eliquis 5 bid.  Today she denies chest pain, pressure, unusual shortness of breath, lightheadedness, dizziness, near syncope or syncope. Saw hematology. Testing for von Willebrand's disease was negative. No contraindication to anticoagulation. She does have easy bruising. She remains on anticoagulation.  Last seen December 2023.  In the interim she had an upper respiratory infection. She had palpitations. Loop recorder demonstrated atrial fibrillation. Episode lasted about 6 hours with rapid rates. Paroxysmal atrial fibrillation blood work June 2024 hemoglobin 12.0 potassium 3.5 creatinine 0.7 LDL 63 CPK 46   Echo 6/2/22 LVEF 55 to 60%, mild MR normal left atrial size normal LV systolic function, normal PASP, mild TR. No significant change from prior study noted. ETT showed abnl EKG response then Nuclear stress test performed 11/23/2020 shows likely normal myocardial perfusion, breast attenuation was noted. Carotid US 11/16/2020 normal carotid Abd US 11/16/2020 no AAA Sleep study was performed in January 2013. There was moderate-to-severe obstructive sleep apnea causing sleep disturbed breathing, nocturnal hypoxemia, and snoring.  Symptomatic paroxysmal atrial fibrillation. electrophysiology evaluation regarding long-term rhythm control strategy.  The episodes of atrial fibrillation have required ER evaluation and management.  She is concerned about the atrial fibrillation episodes as they impact her quality of life  follow-up device check Continue beta-blocker statin and anticoagulation. Monitor thyroid replacement. Treat sleep apnea. increase aerobic exercise  atrial fibrillation underwent cardioversion then antiarrhythmic therapy (propafenone) 2012. Recurrent atrial fibrillation 5/22 2/24. Reviewed ER documentation [facility]. Converted w/ IV cardizem. D/C on metop and eliquis. Educated patient on pathophysiology of atrial fibrillation and natural progression as well as associated risk of cardioembolic event. Reviewed bleeding precautions.  Subclinical atherosclerosis. Nuclear stress test normal perfusion 2020.  Primary prevention - statin, BP control, and lifestyle measures. Hyperlipidemia, refractory to lifestyle measures. Significant family history of accelerated atherosclerosis. Continue statin therapy. Tolerated without adverse effect.  Any questions and concerns were addressed and resolved.

## 2024-10-02 ENCOUNTER — NON-APPOINTMENT (OUTPATIENT)
Age: 74
End: 2024-10-02

## 2024-10-02 ENCOUNTER — APPOINTMENT (OUTPATIENT)
Dept: CARDIOLOGY | Facility: CLINIC | Age: 74
End: 2024-10-02
Payer: MEDICARE

## 2024-10-02 PROCEDURE — 93298 REM INTERROG DEV EVAL SCRMS: CPT

## 2024-11-06 ENCOUNTER — APPOINTMENT (OUTPATIENT)
Dept: CARDIOLOGY | Facility: CLINIC | Age: 74
End: 2024-11-06
Payer: MEDICARE

## 2024-11-06 ENCOUNTER — NON-APPOINTMENT (OUTPATIENT)
Age: 74
End: 2024-11-06

## 2024-11-06 PROCEDURE — 93298 REM INTERROG DEV EVAL SCRMS: CPT

## 2024-12-18 ENCOUNTER — RX RENEWAL (OUTPATIENT)
Age: 74
End: 2024-12-18

## 2025-01-06 ENCOUNTER — NON-APPOINTMENT (OUTPATIENT)
Age: 75
End: 2025-01-06

## 2025-01-06 ENCOUNTER — APPOINTMENT (OUTPATIENT)
Dept: CARDIOLOGY | Facility: CLINIC | Age: 75
End: 2025-01-06
Payer: MEDICARE

## 2025-01-06 ENCOUNTER — RESULT CHARGE (OUTPATIENT)
Age: 75
End: 2025-01-06

## 2025-01-06 VITALS
DIASTOLIC BLOOD PRESSURE: 62 MMHG | OXYGEN SATURATION: 99 % | BODY MASS INDEX: 27.49 KG/M2 | WEIGHT: 161 LBS | HEART RATE: 45 BPM | HEIGHT: 64 IN | SYSTOLIC BLOOD PRESSURE: 120 MMHG

## 2025-01-06 DIAGNOSIS — R94.31 ABNORMAL ELECTROCARDIOGRAM [ECG] [EKG]: ICD-10-CM

## 2025-01-06 DIAGNOSIS — G47.33 OBSTRUCTIVE SLEEP APNEA (ADULT) (PEDIATRIC): ICD-10-CM

## 2025-01-06 DIAGNOSIS — I48.91 UNSPECIFIED ATRIAL FIBRILLATION: ICD-10-CM

## 2025-01-06 DIAGNOSIS — E78.5 HYPERLIPIDEMIA, UNSPECIFIED: ICD-10-CM

## 2025-01-06 PROCEDURE — 93000 ELECTROCARDIOGRAM COMPLETE: CPT

## 2025-01-06 PROCEDURE — 99214 OFFICE O/P EST MOD 30 MIN: CPT

## 2025-01-17 ENCOUNTER — APPOINTMENT (OUTPATIENT)
Dept: CARDIOLOGY | Facility: CLINIC | Age: 75
End: 2025-01-17

## 2025-01-17 ENCOUNTER — NON-APPOINTMENT (OUTPATIENT)
Age: 75
End: 2025-01-17

## 2025-01-17 PROCEDURE — 93298 REM INTERROG DEV EVAL SCRMS: CPT

## 2025-02-21 ENCOUNTER — NON-APPOINTMENT (OUTPATIENT)
Age: 75
End: 2025-02-21

## 2025-02-21 ENCOUNTER — APPOINTMENT (OUTPATIENT)
Dept: CARDIOLOGY | Facility: CLINIC | Age: 75
End: 2025-02-21
Payer: MEDICARE

## 2025-02-21 PROCEDURE — 93298 REM INTERROG DEV EVAL SCRMS: CPT

## 2025-03-28 ENCOUNTER — NON-APPOINTMENT (OUTPATIENT)
Age: 75
End: 2025-03-28

## 2025-03-28 ENCOUNTER — APPOINTMENT (OUTPATIENT)
Dept: CARDIOLOGY | Facility: CLINIC | Age: 75
End: 2025-03-28
Payer: MEDICARE

## 2025-03-28 PROCEDURE — 93298 REM INTERROG DEV EVAL SCRMS: CPT

## 2025-04-07 ENCOUNTER — NON-APPOINTMENT (OUTPATIENT)
Age: 75
End: 2025-04-07

## 2025-04-14 PROBLEM — R23.3 EASY BRUISING: Status: ACTIVE | Noted: 2022-10-26

## 2025-04-14 PROBLEM — Z86.79 HISTORY OF ATRIAL FIBRILLATION: Status: RESOLVED | Noted: 2017-11-09 | Resolved: 2025-04-14

## 2025-04-15 ENCOUNTER — APPOINTMENT (OUTPATIENT)
Dept: CARDIOLOGY | Facility: CLINIC | Age: 75
End: 2025-04-15
Payer: MEDICARE

## 2025-04-15 VITALS
BODY MASS INDEX: 26.8 KG/M2 | HEART RATE: 55 BPM | SYSTOLIC BLOOD PRESSURE: 124 MMHG | WEIGHT: 157 LBS | HEIGHT: 64 IN | OXYGEN SATURATION: 100 % | DIASTOLIC BLOOD PRESSURE: 60 MMHG

## 2025-04-15 DIAGNOSIS — E78.5 HYPERLIPIDEMIA, UNSPECIFIED: ICD-10-CM

## 2025-04-15 DIAGNOSIS — Z86.2 PERSONAL HISTORY OF DISEASES OF THE BLOOD AND BLOOD-FORMING ORGANS AND CERTAIN DISORDERS INVOLVING THE IMMUNE MECHANISM: ICD-10-CM

## 2025-04-15 DIAGNOSIS — Z82.49 FAMILY HISTORY OF ISCHEMIC HEART DISEASE AND OTHER DISEASES OF THE CIRCULATORY SYSTEM: ICD-10-CM

## 2025-04-15 DIAGNOSIS — Z95.818 PRESENCE OF OTHER CARDIAC IMPLANTS AND GRAFTS: ICD-10-CM

## 2025-04-15 DIAGNOSIS — I48.0 PAROXYSMAL ATRIAL FIBRILLATION: ICD-10-CM

## 2025-04-15 DIAGNOSIS — R53.1 WEAKNESS: ICD-10-CM

## 2025-04-15 DIAGNOSIS — Z86.79 PERSONAL HISTORY OF OTHER DISEASES OF THE CIRCULATORY SYSTEM: ICD-10-CM

## 2025-04-15 DIAGNOSIS — D64.9 ANEMIA, UNSPECIFIED: ICD-10-CM

## 2025-04-15 DIAGNOSIS — G47.33 OBSTRUCTIVE SLEEP APNEA (ADULT) (PEDIATRIC): ICD-10-CM

## 2025-04-15 DIAGNOSIS — E03.9 HYPOTHYROIDISM, UNSPECIFIED: ICD-10-CM

## 2025-04-15 DIAGNOSIS — R23.3 SPONTANEOUS ECCHYMOSES: ICD-10-CM

## 2025-04-15 PROCEDURE — 99203 OFFICE O/P NEW LOW 30 MIN: CPT

## 2025-04-30 ENCOUNTER — APPOINTMENT (OUTPATIENT)
Dept: CARDIOLOGY | Facility: CLINIC | Age: 75
End: 2025-04-30

## 2025-05-02 ENCOUNTER — APPOINTMENT (OUTPATIENT)
Dept: CARDIOLOGY | Facility: CLINIC | Age: 75
End: 2025-05-02
Payer: MEDICARE

## 2025-05-02 ENCOUNTER — NON-APPOINTMENT (OUTPATIENT)
Age: 75
End: 2025-05-02

## 2025-05-02 PROCEDURE — 93298 REM INTERROG DEV EVAL SCRMS: CPT

## 2025-06-06 ENCOUNTER — APPOINTMENT (OUTPATIENT)
Dept: CARDIOLOGY | Facility: CLINIC | Age: 75
End: 2025-06-06

## 2025-06-06 ENCOUNTER — NON-APPOINTMENT (OUTPATIENT)
Age: 75
End: 2025-06-06

## 2025-06-06 PROCEDURE — 93298 REM INTERROG DEV EVAL SCRMS: CPT

## 2025-06-11 ENCOUNTER — APPOINTMENT (OUTPATIENT)
Dept: CARDIOLOGY | Facility: CLINIC | Age: 75
End: 2025-06-11

## 2025-06-11 ENCOUNTER — APPOINTMENT (OUTPATIENT)
Dept: CARDIOLOGY | Facility: CLINIC | Age: 75
End: 2025-06-11
Payer: MEDICARE

## 2025-06-11 PROCEDURE — 93015 CV STRESS TEST SUPVJ I&R: CPT

## 2025-06-13 ENCOUNTER — RX RENEWAL (OUTPATIENT)
Age: 75
End: 2025-06-13

## 2025-07-11 ENCOUNTER — APPOINTMENT (OUTPATIENT)
Dept: CARDIOLOGY | Facility: CLINIC | Age: 75
End: 2025-07-11

## 2025-07-11 PROCEDURE — 93298 REM INTERROG DEV EVAL SCRMS: CPT

## 2025-07-16 ENCOUNTER — APPOINTMENT (OUTPATIENT)
Dept: CARDIOLOGY | Facility: CLINIC | Age: 75
End: 2025-07-16
Payer: MEDICARE

## 2025-07-16 VITALS
WEIGHT: 158 LBS | OXYGEN SATURATION: 98 % | HEART RATE: 80 BPM | SYSTOLIC BLOOD PRESSURE: 106 MMHG | BODY MASS INDEX: 26.98 KG/M2 | DIASTOLIC BLOOD PRESSURE: 58 MMHG | HEIGHT: 64 IN

## 2025-07-16 PROBLEM — I49.3 PVCS (PREMATURE VENTRICULAR CONTRACTIONS): Status: ACTIVE | Noted: 2025-07-16

## 2025-07-16 PROCEDURE — 99214 OFFICE O/P EST MOD 30 MIN: CPT

## 2025-07-16 RX ORDER — CARBIDOPA AND LEVODOPA 25; 100 MG/1; MG/1
25-100 TABLET ORAL
Refills: 0 | Status: ACTIVE | COMMUNITY

## 2025-07-21 ENCOUNTER — RX RENEWAL (OUTPATIENT)
Age: 75
End: 2025-07-21

## 2025-08-15 ENCOUNTER — APPOINTMENT (OUTPATIENT)
Dept: CARDIOLOGY | Facility: CLINIC | Age: 75
End: 2025-08-15
Payer: MEDICARE

## 2025-08-15 ENCOUNTER — NON-APPOINTMENT (OUTPATIENT)
Age: 75
End: 2025-08-15

## 2025-08-15 PROCEDURE — 93298 REM INTERROG DEV EVAL SCRMS: CPT

## 2025-09-19 ENCOUNTER — APPOINTMENT (OUTPATIENT)
Dept: CARDIOLOGY | Facility: CLINIC | Age: 75
End: 2025-09-19

## 2025-09-19 ENCOUNTER — NON-APPOINTMENT (OUTPATIENT)
Age: 75
End: 2025-09-19

## 2025-09-24 PROBLEM — Z86.69 HISTORY OF PARKINSON'S DISEASE: Status: RESOLVED | Noted: 2025-09-24 | Resolved: 2025-09-24
